# Patient Record
Sex: MALE | Race: BLACK OR AFRICAN AMERICAN | Employment: OTHER | URBAN - METROPOLITAN AREA
[De-identification: names, ages, dates, MRNs, and addresses within clinical notes are randomized per-mention and may not be internally consistent; named-entity substitution may affect disease eponyms.]

---

## 2019-09-18 ENCOUNTER — OFFICE VISIT (OUTPATIENT)
Dept: INTERNAL MEDICINE CLINIC | Age: 64
End: 2019-09-18

## 2019-09-18 VITALS
WEIGHT: 158.4 LBS | OXYGEN SATURATION: 93 % | DIASTOLIC BLOOD PRESSURE: 76 MMHG | SYSTOLIC BLOOD PRESSURE: 119 MMHG | BODY MASS INDEX: 29.91 KG/M2 | RESPIRATION RATE: 18 BRPM | HEIGHT: 61 IN | HEART RATE: 79 BPM | TEMPERATURE: 98.7 F

## 2019-09-18 DIAGNOSIS — B18.2 HEP C W/ COMA, CHRONIC: ICD-10-CM

## 2019-09-18 DIAGNOSIS — R79.9 ABNORMAL FINDING OF BLOOD CHEMISTRY: ICD-10-CM

## 2019-09-18 DIAGNOSIS — F11.20 METHADONE MAINTENANCE THERAPY PATIENT (HCC): ICD-10-CM

## 2019-09-18 DIAGNOSIS — R35.1 NOCTURIA: ICD-10-CM

## 2019-09-18 DIAGNOSIS — F32.A DEPRESSION, UNSPECIFIED DEPRESSION TYPE: ICD-10-CM

## 2019-09-18 DIAGNOSIS — K86.89 MASS OF PANCREAS: Primary | ICD-10-CM

## 2019-09-18 DIAGNOSIS — C7B.8 OTHER SECONDARY NEUROENDOCRINE TUMORS (HCC): ICD-10-CM

## 2019-09-18 NOTE — PATIENT INSTRUCTIONS
Body Mass Index: Care Instructions Your Care Instructions Body mass index (BMI) can help you see if your weight is raising your risk for health problems. It uses a formula to compare how much you weigh with how tall you are. · A BMI lower than 18.5 is considered underweight. · A BMI between 18.5 and 24.9 is considered healthy. · A BMI between 25 and 29.9 is considered overweight. A BMI of 30 or higher is considered obese. If your BMI is in the normal range, it means that you have a lower risk for weight-related health problems. If your BMI is in the overweight or obese range, you may be at increased risk for weight-related health problems, such as high blood pressure, heart disease, stroke, arthritis or joint pain, and diabetes. If your BMI is in the underweight range, you may be at increased risk for health problems such as fatigue, lower protection (immunity) against illness, muscle loss, bone loss, hair loss, and hormone problems. BMI is just one measure of your risk for weight-related health problems. You may be at higher risk for health problems if you are not active, you eat an unhealthy diet, or you drink too much alcohol or use tobacco products. Follow-up care is a key part of your treatment and safety. Be sure to make and go to all appointments, and call your doctor if you are having problems. It's also a good idea to know your test results and keep a list of the medicines you take. How can you care for yourself at home? · Practice healthy eating habits. This includes eating plenty of fruits, vegetables, whole grains, lean protein, and low-fat dairy. · If your doctor recommends it, get more exercise. Walking is a good choice. Bit by bit, increase the amount you walk every day. Try for at least 30 minutes on most days of the week. · Do not smoke. Smoking can increase your risk for health problems.  If you need help quitting, talk to your doctor about stop-smoking programs and medicines. These can increase your chances of quitting for good. · Limit alcohol to 2 drinks a day for men and 1 drink a day for women. Too much alcohol can cause health problems. If you have a BMI higher than 25 · Your doctor may do other tests to check your risk for weight-related health problems. This may include measuring the distance around your waist. A waist measurement of more than 40 inches in men or 35 inches in women can increase the risk of weight-related health problems. · Talk with your doctor about steps you can take to stay healthy or improve your health. You may need to make lifestyle changes to lose weight and stay healthy, such as changing your diet and getting regular exercise. If you have a BMI lower than 18.5 · Your doctor may do other tests to check your risk for health problems. · Talk with your doctor about steps you can take to stay healthy or improve your health. You may need to make lifestyle changes to gain or maintain weight and stay healthy, such as getting more healthy foods in your diet and doing exercises to build muscle. Where can you learn more? Go to http://nadeem-denis.info/. Enter S176 in the search box to learn more about \"Body Mass Index: Care Instructions. \" Current as of: October 13, 2016 Content Version: 11.4 © 0950-5929 Healthwise, Incorporated. Care instructions adapted under license by [employer] (which disclaims liability or warranty for this information). If you have questions about a medical condition or this instruction, always ask your healthcare professional. [place] [address] any warranty or liability for your use of this information.  
n4

## 2019-09-18 NOTE — PROGRESS NOTES
1. Have you been to the ER, urgent care clinic since your last visit? Hospitalized since your last visit? No    2. Have you seen or consulted any other health care providers outside of the [address] [place] [address] since your last visit? Include any pap smears or colon screening.  No     Wants to discuss hep c

## 2019-09-18 NOTE — PROGRESS NOTES
SPORTS MEDICINE AND PRIMARY CARE  Pita Ring MD, [address] [address]  Phone:  [phone]  Fax: [phone]    Chief Complaint   Patient presents with    Establish Care       SUBJECTIVE:    Hernan Johnson is a 75 y.o. male Patient returns today and is seen as a new patient for evaluation and ongoing care. Patient comes in today for evaluation of some abnormal imaging studies, as well as cytology opinion. Review of records from [facility] find that he had an MRCP of the abdomen on 03/15/19 that revealed biliary and pancreatic duct dilatation, the etiology was indeterminant, findings could be related to ampullary stricture and recommended ERCP. On 04/18/19 Bautista Perez performed upper EUS with the impression of normal esophagus, normal stomach, normal exam of duodenum. There was dilatation of the common bile duct, which measures up to 10 mm. Pancreatic duct had dilated _______________ appearance and the pancreatic head. The pancreatic duct measured up to 4 mm in diameter. There was no sign of significant pathology in the pancreatic head, pancreatic body, pancreatic tail and _______________ process of the pancreas. A mass was identified in the pancreatic neck. FNA was performed. The cytology and cyto diagnosis as positive for abnormal cells, well differentiated neuroendocrine tumor not ruled out. The additional cell block material was virtually acellular, therefore additional testing could not be performed. He also brings us lab studies from [facility], indicating he has a hemoglobin A1c of 5.8, done on 12/18/18, cholesterol was high at 218, , calcium 10.5 and hepatitis C virus antibody was greater than 11.0, which was high. Follow up was recommended. CBC was unremarkable from all the information we have. So he wants an opinion from all those things as he is not satisfied with the answers _______________ that he has been given.           Current Outpatient Medications   Medication Sig Dispense Refill    methadone (DOLOPHINE) 10 mg/mL solution Take 58 mg by mouth daily.  aspirin 81 mg chewable tablet Take 81 mg by mouth daily.  multivitamin (ONE A DAY) tablet Take 1 Tab by mouth daily.  LORazepam (ATIVAN) 0.5 mg tablet Take  by mouth nightly as needed for Anxiety. Past Medical History:   Diagnosis Date    Depression     depression    Hep C w/ coma, chronic ([facility] [address] 12/18/19    Hep C    Mass of pancreas 04/18/2019    eus 4-18-19  w. savanah arrieta md gi - cytology + lymphoid vs neuroendocrine tumor    Methadone maintenance therapy patient [facility])      History reviewed. No pertinent surgical history.   No Known Allergies    REVIEW OF SYSTEMS:  General: negative for - chills or fever  ENT: negative for - headaches, nasal congestion or tinnitus  Respiratory: negative for - cough, hemoptysis, shortness of breath or wheezing  Cardiovascular : negative for - chest pain, edema, palpitations or shortness of breath  Gastrointestinal: negative for - abdominal pain, blood in stools, heartburn or nausea/vomiting  Genito-Urinary: no dysuria, trouble voiding, or hematuria  Musculoskeletal: negative for - gait disturbance, joint pain, joint stiffness or joint swelling  Neurological: no TIA or stroke symptoms  Hematologic: no bruises, no bleeding, no swollen glands  Integument: no lumps, mole changes, nail changes or rash  Endocrine:no malaise/lethargy or unexpected weight changes      Social History     Socioeconomic History    Marital status:      Spouse name: Not on file    Number of children: Not on file    Years of education: Not on file    Highest education level: Not on file   Tobacco Use    Smoking status: Current Every Day Smoker     Packs/day: 0.50    Smokeless tobacco: Never Used   Substance and Sexual Activity    Alcohol use: Yes     Frequency: Never     Comment: occasional    Drug use: Not Currently     Comment: quit 40 years ago    Sexual activity: Not Currently     Family History   Problem Relation Age of Onset    Cancer Father      Habits:  Occasional alcohol use. 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 He continues to abuse cigarettes, claims to be trying to stop. His wife stopped two years ago. Social History:   The patient is  for the past 25 years. He has two daughters outside his marriage, 36 and 44, and three grandchildren. He completed the 10th grade, but got his GED. He is a retired . Congregation preference is [employer]. Family History:  Father  48 of cancer, presumably of lung. Mother  80, brain tumor. No siblings. OBJECTIVE:     Visit Vitals  /76   Pulse 79   Temp 98.7 °F (37.1 °C) (Oral)   Resp 18   Ht 5' 1\" (1.549 m)   Wt 158 lb 6.4 oz (71.8 kg)   SpO2 93%   BMI 29.93 kg/m²     CONSTITUTIONAL: well , well nourished, appears age appropriate  EYES: perrla, eom intact  ENMT:moist mucous membranes, pharynx clear  NECK: supple. Thyroid normal  RESPIRATORY: Chest: clear bilaterally  CARDIOVASCULAR: Heart: regular rate and rhythm  GASTROINTESTINAL: Abdomen: soft, bowel sounds active  HEMATOLOGIC: no pathological lymph nodes palpated  MUSCULOSKELETAL: Extremities: no edema, pulse 1+   INTEGUMENT: No unusual rashes or suspicious skin lesions noted. Nails appear normal.  NEUROLOGIC: non-focal exam   MENTAL STATUS: alert and oriented, appropriate affect     No visits with results within 3 Month(s) from this visit. Latest known visit with results is:   No results found for any previous visit. ASSESSMENT:   1. Mass of pancreas    2. Hep C w/ coma, chronic (HCC)    3. Depression, unspecified depression type    4. Methadone maintenance therapy patient ([facility] [address]    5. Nocturia     6. Abnormal finding of blood chemistry     7.  Other secondary neuroendocrine tumors [facility])       *Patient is in a methadone maintenance program and we congratulate him. He is trying to get off methadone also and they are gradually decreasing the dose. The bottom line to the discussion noted above is the mass in his pancreas and we do not have a definite diagnosis, cytology was positive for abnormal cells, whether it was well differentiated neuroendocrine tumor not ruled out versus lymphoid. He wanted a different opinion. Our opinion is that he should see a gastroenterologist as we cannot render an opinion as to the best pathway to go regarding the findings. I suspect he will need a repeat EUS. At the same time, he will have a colonoscopy, which he also needs to have accomplished. He is serologic positive for hepatitis C. Will ask for a genotype and refer him to Dr. Adelaida Martinez. There is a history of depressive illness and he was hospitalized by [facility] in 2018. No evidence of recurrent disease. Patient is advised that we agree he needs to stop smoking cigarettes and to wean himself, it would be best just to stop cold turkey and use a patch and/or gum. He will be back to see us after he sees the physicians we referred him to. Discussed the patient's BMI with him. The BMI follow up plan is as follows:     dietary management education, guidance, and counseling  encourage exercise  monitor weight  prescribed dietary intake    An After Visit Summary was printed and given to the patient. I have discussed the diagnosis with the patient and the intended plan as seen in the  orders above. The patient understands and agees with the plan. The patient has   received an after visit summary and questions were answered concerning  future plans  Patient labs and/or xrays were reviewed  Past records were reviewed.     PLAN:  .  Orders Placed This Encounter    URINALYSIS W/ RFLX MICROSCOPIC    CBC WITH AUTOMATED DIFF    METABOLIC PANEL, COMPREHENSIVE    LIPID PANEL    PROSTATE SPECIFIC AG    HEMOGLOBIN A1C WITH EAG    HEP C GENOTYPE    CEA    REFERRAL TO OPHTHALMOLOGY    REFERRAL FOR COLONOSCOPY    REFERRAL TO GASTROENTEROLOGY    [facility] ref [facility]       Follow-up and Dispositions    · Return in about 3 months (around 12/18/2019). ATTENTION:   This medical record was transcribed using an electronic medical records system. Although proofread, it may and can contain electronic and spelling errors. Other human spelling and other errors may be present. Corrections may be executed at a later time. Please feel free to contact us for any clarifications as needed.

## 2019-09-19 LAB
ALBUMIN SERPL-MCNC: 4.7 G/DL (ref 3.6–4.8)
ALBUMIN/GLOB SERPL: 1.6 {RATIO} (ref 1.2–2.2)
ALP SERPL-CCNC: 65 IU/L (ref 39–117)
ALT SERPL-CCNC: 31 IU/L (ref 0–44)
APPEARANCE UR: CLEAR
AST SERPL-CCNC: 32 IU/L (ref 0–40)
BASOPHILS # BLD AUTO: 0 X10E3/UL (ref 0–0.2)
BASOPHILS NFR BLD AUTO: 1 %
BILIRUB SERPL-MCNC: 0.4 MG/DL (ref 0–1.2)
BILIRUB UR QL STRIP: NEGATIVE
BUN SERPL-MCNC: 11 MG/DL (ref 8–27)
BUN/CREAT SERPL: 12 (ref 10–24)
CALCIUM SERPL-MCNC: 10.2 MG/DL (ref 8.6–10.2)
CEA SERPL-MCNC: 3.8 NG/ML (ref 0–4.7)
CHLORIDE SERPL-SCNC: 99 MMOL/L (ref 96–106)
CHOLEST SERPL-MCNC: 241 MG/DL (ref 100–199)
CO2 SERPL-SCNC: 27 MMOL/L (ref 20–29)
COLOR UR: YELLOW
CREAT SERPL-MCNC: 0.91 MG/DL (ref 0.76–1.27)
EOSINOPHIL # BLD AUTO: 0.4 X10E3/UL (ref 0–0.4)
EOSINOPHIL NFR BLD AUTO: 6 %
ERYTHROCYTE [DISTWIDTH] IN BLOOD BY AUTOMATED COUNT: 14.1 % (ref 12.3–15.4)
EST. AVERAGE GLUCOSE BLD GHB EST-MCNC: 123 MG/DL
GLOBULIN SER CALC-MCNC: 3 G/DL (ref 1.5–4.5)
GLUCOSE SERPL-MCNC: 94 MG/DL (ref 65–99)
GLUCOSE UR QL: NEGATIVE
HBA1C MFR BLD: 5.9 % (ref 4.8–5.6)
HCT VFR BLD AUTO: 43.6 % (ref 37.5–51)
HDLC SERPL-MCNC: 58 MG/DL
HGB BLD-MCNC: 14.3 G/DL (ref 13–17.7)
HGB UR QL STRIP: NEGATIVE
IMM GRANULOCYTES # BLD AUTO: 0 X10E3/UL (ref 0–0.1)
IMM GRANULOCYTES NFR BLD AUTO: 0 %
KETONES UR QL STRIP: NEGATIVE
LDLC SERPL CALC-MCNC: 166 MG/DL (ref 0–99)
LEUKOCYTE ESTERASE UR QL STRIP: NEGATIVE
LYMPHOCYTES # BLD AUTO: 2.6 X10E3/UL (ref 0.7–3.1)
LYMPHOCYTES NFR BLD AUTO: 44 %
MCH RBC QN AUTO: 32.1 PG (ref 26.6–33)
MCHC RBC AUTO-ENTMCNC: 32.8 G/DL (ref 31.5–35.7)
MCV RBC AUTO: 98 FL (ref 79–97)
MICRO URNS: NORMAL
MONOCYTES # BLD AUTO: 0.4 X10E3/UL (ref 0.1–0.9)
MONOCYTES NFR BLD AUTO: 7 %
NEUTROPHILS # BLD AUTO: 2.5 X10E3/UL (ref 1.4–7)
NEUTROPHILS NFR BLD AUTO: 42 %
NITRITE UR QL STRIP: NEGATIVE
PH UR STRIP: 5.5 [PH] (ref 5–7.5)
PLATELET # BLD AUTO: 288 X10E3/UL (ref 150–450)
POTASSIUM SERPL-SCNC: 5.3 MMOL/L (ref 3.5–5.2)
PROT SERPL-MCNC: 7.7 G/DL (ref 6–8.5)
PROT UR QL STRIP: NEGATIVE
PSA SERPL-MCNC: 0.2 NG/ML (ref 0–4)
RBC # BLD AUTO: 4.45 X10E6/UL (ref 4.14–5.8)
SODIUM SERPL-SCNC: 142 MMOL/L (ref 134–144)
SP GR UR: 1.01 (ref 1–1.03)
TRIGL SERPL-MCNC: 85 MG/DL (ref 0–149)
UROBILINOGEN UR STRIP-MCNC: 0.2 MG/DL (ref 0.2–1)
VLDLC SERPL CALC-MCNC: 17 MG/DL (ref 5–40)
WBC # BLD AUTO: 5.9 X10E3/UL (ref 3.4–10.8)

## 2019-09-25 LAB
HCV GENTYP SERPL NAA+PROBE: NORMAL
PLEASE NOTE, 550474: NORMAL

## 2019-10-21 ENCOUNTER — OFFICE VISIT (OUTPATIENT)
Dept: HEMATOLOGY | Age: 64
End: 2019-10-21

## 2019-10-21 VITALS
WEIGHT: 157 LBS | HEART RATE: 85 BPM | OXYGEN SATURATION: 98 % | DIASTOLIC BLOOD PRESSURE: 86 MMHG | TEMPERATURE: 97 F | SYSTOLIC BLOOD PRESSURE: 129 MMHG | BODY MASS INDEX: 29.66 KG/M2

## 2019-10-21 DIAGNOSIS — Z11.59 ENCOUNTER FOR SCREENING FOR OTHER VIRAL DISEASES: ICD-10-CM

## 2019-10-21 DIAGNOSIS — B18.2 HEP C W/ COMA, CHRONIC: Primary | ICD-10-CM

## 2019-10-21 NOTE — PROGRESS NOTES
[address] [facility], [place] Noel Kraft, MD Deirdre Boot, PA-C    Chelsey Barillas, [facility]     Svetlana Moore, [facility]   Syeda Sanches, [facility]    Alexandrea Lopez, [facility]       Sweta Pate [address]    at [address], [address] [address]  [address].    [phone]    FAX: [address] [place] [address] Drive, [address], [address]    [phone]    FAX: [phone]     Patient Care Team:  Carol Kaiser MD (Internal Medicine)    Problem List  Never Reviewed          Codes Class Noted    [facility] ICD-10-CM: B18.2  ICD-9-CM: 070.44  Unknown    Overview Signed 9/18/2019  4:22 PM by Carol Kaiser MD     Hep C             Depression ICD-10-CM: F32.9  ICD-9-CM: 992  Unknown    Overview Signed 9/18/2019  4:35 PM by Carol Kaiser MD     depression             Methadone maintenance therapy patient [facility]) ICD-10-CM: F11.20  ICD-9-CM: 304.00  Unknown        Mass of pancreas ICD-10-CM: K86.89  ICD-9-CM: 577.8  4/18/2019    Overview Signed 9/18/2019  4:22 PM by Carol Kaiser MD     [facility] [id number]  w. md jenaro Parada                 The clinician listed above has asked me to see Edilson Huertas in consultation regarding chronic HCV and its management. All medical records sent by the referring physicians were reviewed. The patient is a 75 y.o.  male who was screened for and subsequently tested positive for chronic HCV in 2019. Risk factors for acquiring HCV are IV drug use. There was no history of acute icteric hepatitis at the time of these risk factors. Imaging of the liver was either not performed or the results are not available to me.       An assessment of liver fibrosis with biopsy or elastography has not been performed. The patient has never received treatment for chronic HCV. The patient has no symptoms which can be attributed to the liver disorder. The patient has not experienced the following symptoms: pain in the right side over the liver, yellowing of the eyes or skin or swelling of the abdomen. The patient completes all daily activities without any functional limitations. ASSESSMENT AND PLAN:  Chronic HCV   Chronic HCV of unclear severity. Liver transaminases are normal. ALP is normal. Liver function is normal. Total bilirubin is normal. Serum albumin is normal. The platelet count is   normal.      Based upon laboratory studies. the patient does not appear to have   appears to have advanced liver disease. Will perform and/or review results of HCV viral load and/or HCV genotype   to define the specific treatment and duration of treatment that will be required. Will perform serologic and virologic studies to assess for other causes of chronic liver disease. Will perform imaging of the liver with ultrasound. The degree of fibrosis will be assessed by liver biopsy, FibroScan or sheer wave elastography. Chronic HCV treatment  The patient has not been treated for HCV. The patient has HCV genotype 1A. Discussed the treatment alternatives. The SVR/cure rate for HCV now exceeds 97% without significant side effects for most patients with HCV. The specific treatment is dependent upon genotype, viral load and histology. Screening for hepatocellular carcinoma  HCC screening is not necessary if the patient has no evidence of cirrhosis. Treatment of other medical problems in patients with chronic liver disease  There are no contraindications for the patient to take most medications necessary for treatment of other medical issues. The patient does not consume alcohol on a daily basis.  Normal doses of acetaminophen, as recommended on the label of the bottle, are not hepatotoxic except in the setting of daily alcohol use. Even patients with cirrhosis can utilize these for pain. Counseling for alcohol in patients with chronic liver disease  The patient was counseled regarding alcohol consumption and the effect of alcohol on chronic liver disease. The patient does not consume any significant amount of alcohol. Drug use  The patient was counseled regarding the risk of overdose and death from using narcotic drugs and the risk of becoming reinfected with HCV once they are cured if they resume narcotic drug use. The patient has not used drugs since 1980. Vaccinations   The need for vaccination against viral hepatitis A and B will be assessed with serologic and instituted as appropriate. Routine vaccinations against other bacterial and viral agents can be performed as indicated. Annual flu vaccination should be administered if indicated. ALLERGIES  No Known Allergies    MEDICATIONS  Current Outpatient Medications   Medication Sig    methadone (DOLOPHINE) 10 mg/mL solution Take 56 mg by mouth daily.  aspirin 81 mg chewable tablet Take 81 mg by mouth daily.  multivitamin (ONE A DAY) tablet Take 1 Tab by mouth daily.  LORazepam (ATIVAN) 0.5 mg tablet Take  by mouth nightly as needed for Anxiety. No current facility-administered medications for this visit. SYSTEM REVIEW NOT RELATED TO LIVER DISEASE OR REVIEWED ABOVE:  Constitution systems: Negative for fever, chills, weight gain, weight loss. Eyes: Negative for visual changes. ENT: Negative for sore throat, painful swallowing. Respiratory: Negative for cough, hemoptysis, SOB. Cardiology: Negative for chest pain, palpitations. GI:  Negative for constipation or diarrhea. : Negative for urinary frequency, dysuria, hematuria, nocturia. Skin: Negative for rash. Hematology: Negative for easy bruising, blood clots. Musculo-skeletal: Negative for back pain, muscle pain, weakness. Neurologic: Negative for headaches, dizziness, vertigo, memory problems not related to HE. Psychology: Negative for anxiety, depression. FAMILY HISTORY:  The father  from lung cancer. The mother  at age 80. Brain tumors. There is no history of liver diseases  There is no history of autoimmune diseases. SOCIAL HISTORY:  The patient is . The spouse has not been tested for HCV and is negative. The patient has 2 children and 4 grandchildren. The patient 1 PPD. The patient occasionally drinks alcohol. The patient is not currently working. PHYSICAL EXAMINATION:  Visit Vitals  /86 (BP 1 Location: Left arm, BP Patient Position: Sitting)   Pulse 85   Temp 97 °F (36.1 °C) (Tympanic)   Wt 157 lb (71.2 kg)   SpO2 98%   BMI 29.66 kg/m²     General: No acute distress. Eyes: Sclera anicteric. ENT: No oral lesions. Nodes: No adenopathy. Skin: No spider angiomata. No jaundice. No palmar erythema. Respiratory: Lungs clear to auscultation. Cardiovascular: Regular heart rate. No murmurs. No JVD. Abdomen: Soft non-tender. Liver size normal to percussion/palpation. Spleen not palpable. No obvious ascites. Extremities: No edema. No muscle wasting. No gross arthritic changes. Neurologic: Alert and oriented. Cranial nerves grossly intact. No asterixis. LABORATORY STUDIES:  Liver [place] of [address] Units 2019   WBC 3.4 - 10.8 x10E3/uL 5.9   ANC 1.4 - 7.0 x10E3/uL 2.5   HGB 13.0 - 17.7 g/dL 14.3    - 450 x10E3/uL 288   AST 0 - 40 IU/L 32   ALT 0 - 44 IU/L 31   Alk Phos 39 - 117 IU/L 65   Bili, Total 0.0 - 1.2 mg/dL 0.4   Albumin 3.6 - 4.8 g/dL 4.7   BUN 8 - 27 mg/dL 11   Creat 0.76 - 1.27 mg/dL 0.91   Na 134 - 144 mmol/L 142   K 3.5 - 5.2 mmol/L 5.3 (H)   Cl 96 - 106 mmol/L 99   CO2 20 - 29 mmol/L 27   Glucose 65 - 99 mg/dL 94     SEROLOGIES:  2019.  GT 1a    LIVER HISTOLOGY:  Not available or performed    ENDOSCOPIC PROCEDURES:  Not available or performed    RADIOLOGY:  3/2019. MRCP. Biliary and pancreatic ductal dilatation. OTHER TESTING:  Not available or performed    FOLLOW-UP:  All of the issues listed above in the assessment and plan were discussed with the patient. All questions were answered. The patient expressed a clear understanding of the above. [address] [place] [address] in 4 weeks for FibroScan, to review all data and determine the treatment plan.     Jennifer Swift, [facility]  Liver [place] [address], [address] [place] [place]  22.  [phone]

## 2019-10-21 NOTE — PROGRESS NOTES
Chief Complaint   Patient presents with   [address] Patient     Hep C        3 most recent PHQ Screens 10/21/2019   Little interest or pleasure in doing things Several days   Feeling down, depressed, irritable, or hopeless Several days   Total Score PHQ 2 2       Abuse Screening Questionnaire 10/21/2019   Do you ever feel afraid of your partner? N   Are you in a relationship with someone who physically or mentally threatens you? N   Is it safe for you to go home?  Y     Learning Assessment 10/21/2019   PRIMARY LEARNER Patient   HIGHEST LEVEL OF EDUCATION - PRIMARY LEARNER  -   BARRIERS PRIMARY LEARNER NONE   CO-LEARNER CAREGIVER No   PRIMARY LANGUAGE ENGLISH   LEARNER PREFERENCE PRIMARY DEMONSTRATION   ANSWERED BY patient   RELATIONSHIP SELF     Visit Vitals  /86 (BP 1 Location: Left arm, BP Patient Position: Sitting)   Pulse 85   Temp 97 °F (36.1 °C) (Tympanic)   Wt 157 lb (71.2 kg)   SpO2 98%   BMI 29.66 kg/m²

## 2019-10-21 NOTE — LETTER
11/14/19 Patient: Percy Arenas YOB: 1955 Date of Visit: 10/21/2019 Antony Lundborg, MD 
[facility] [address] [facility] [address] [address] VIA In Basket Dear Antony Lundborg, MD, Thank you for referring Mr. Per[facility]ren[facility]. My notes for this consultation are attached. If you have questions, please do not hesitate to call me. I look forward to following your patient along with you. Sincerely, Kristal Meadows NP

## 2019-10-22 LAB
HAV AB SER QL IA: NEGATIVE
HBV CORE AB SERPL QL IA: NEGATIVE
HBV SURFACE AB SER QL: NON REACTIVE
HBV SURFACE AG SERPL QL IA: NEGATIVE

## 2019-10-24 LAB
HCV RNA SERPL NAA+PROBE-ACNC: NORMAL IU/ML
HCV RNA SERPL NAA+PROBE-ACNC: NORMAL IU/ML
HCV RNA SERPL NAA+PROBE-LOG IU: 7.25 {LOG_IU}/ML
HCV RNA SERPL QL NAA+PROBE: POSITIVE
TEST INFORMATION: NORMAL

## 2019-11-14 ENCOUNTER — OFFICE VISIT (OUTPATIENT)
Dept: HEMATOLOGY | Age: 64
End: 2019-11-14

## 2019-11-14 VITALS
SYSTOLIC BLOOD PRESSURE: 125 MMHG | OXYGEN SATURATION: 95 % | WEIGHT: 156 LBS | TEMPERATURE: 96.7 F | BODY MASS INDEX: 29.45 KG/M2 | HEIGHT: 61 IN | HEART RATE: 74 BPM | DIASTOLIC BLOOD PRESSURE: 79 MMHG

## 2019-11-14 DIAGNOSIS — B18.2 HEP C W/ COMA, CHRONIC: Primary | ICD-10-CM

## 2019-11-14 NOTE — PROGRESS NOTES
[address] [facility], Maggie LEZAMA Alane Dunn, MD Clent Ask, PA-C Morrell Gallop, ACNP-BC     April S Teresa, [facility] Campus[facility]-BC   CARLY [facility]       [facility]    at [place] [address], [address] [address]  22.    [phone]    FAX: [address] [place] [address], [address], [address]    [phone]    FAX: [phone]     Patient Care Team:  Seema Meehan MD (Internal Medicine)    Problem List  Never Reviewed          Codes Class Noted    Hep [facility]/ [facility] ([facility] ICD-10-CM: B18.2  ICD-9-CM: 070.44  Unknown    Overview Signed 9/18/2019  4:22 PM by Seema Meehan MD     Hep C             Depression ICD-10-CM: F32.9  ICD-9-CM: 780  Unknown    Overview Signed 9/18/2019  4:35 PM by Seema Meehan MD     depression             Methadone maintenance therapy patient [facility]) ICD-10-CM: F11.20  ICD-9-CM: 304.00  Unknown        Mass of pancreas ICD-10-CM: K86.89  ICD-9-CM: 577.8  4/18/2019    Overview Signed 9/18/2019  4:22 PM by Seema Meehan MD     eus [id number]  w. md jenaro Evangelista returns to the [address] for management of chronic HCV. The active problem list, all pertinent past medical history, medications,   liver histology, radiologic findings and laboratory findings related to the liver disorder were reviewed with the patient. The patient is a 75 y.o.  male who was screened for and subsequently tested positive for chronic HCV in 2019. An MRCP shows a normal appearing liver. An assessment of liver fibrosis with elastography will be performed this office visit.       The patient has never received treatment for chronic HCV. The patient has no symptoms which can be attributed to the liver disorder. The patient has not experienced the following symptoms: pain in the right side over the liver, yellowing of the eyes or skin or swelling of the abdomen. The patient completes all daily activities without any functional limitations. ASSESSMENT AND PLAN:  Chronic HCV   Chronic HCV with no fibrosis. Liver transaminases are normal. ALP is normal. Liver function is normal. Total bilirubin is normal. Serum albumin is normal. The platelet count is normal.      Based upon laboratory studies, imaging and elastography, the patient does not appear to have advanced liver disease. The patient has no fibrosis. Chronic HCV treatment  The patient has not been treated for HCV. The patient has HCV genotype 1A. Discussed the treatment alternatives. The SVR/cure rate for HCV now exceeds 97% without significant side effects for most patients with HCV. I will order 8 weeks of Mavyret. Screening for hepatocellular carcinoma  HCC screening is not necessary if the patient has no evidence of cirrhosis. Treatment of other medical problems in patients with chronic liver disease  There are no contraindications for the patient to take most medications necessary for treatment of other medical issues. The patient does not consume alcohol on a daily basis. Normal doses of acetaminophen, as recommended on the label of the bottle, are not hepatotoxic except in the setting of daily alcohol use. Even patients with cirrhosis can utilize these for pain. Counseling for alcohol in patients with chronic liver disease  The patient was counseled regarding alcohol consumption and the effect of alcohol on chronic liver disease. The patient does not consume any significant amount of alcohol.     Drug use  The patient was counseled regarding the risk of overdose and death from using narcotic drugs and the risk of becoming reinfected with HCV once they are cured if they resume narcotic drug use. The patient has not used drugs since . Vaccinations   Vaccination against viral hepatitis A and B is recommended as he has no documented immunity. Routine vaccinations against other bacterial and viral agents can be performed as indicated. Annual flu vaccination should be administered if indicated. ALLERGIES  No Known Allergies    MEDICATIONS  Current Outpatient Medications   Medication Sig    methadone (DOLOPHINE) 10 mg/mL solution Take 56 mg by mouth daily.  aspirin 81 mg chewable tablet Take 81 mg by mouth daily.  multivitamin (ONE A DAY) tablet Take 1 Tab by mouth daily.  LORazepam (ATIVAN) 0.5 mg tablet Take  by mouth nightly as needed for Anxiety. No current facility-administered medications for this visit. SYSTEM REVIEW NOT RELATED TO LIVER DISEASE OR REVIEWED ABOVE:  Constitution systems: Negative for fever, chills, weight gain, weight loss. Eyes: Negative for visual changes. ENT: Negative for sore throat, painful swallowing. Respiratory: Negative for cough, hemoptysis, SOB. Cardiology: Negative for chest pain, palpitations. GI:  Negative for constipation or diarrhea. : Negative for urinary frequency, dysuria, hematuria, nocturia. Skin: Negative for rash. Hematology: Negative for easy bruising, blood clots. Musculo-skeletal: Negative for back pain, muscle pain, weakness. Neurologic: Negative for headaches, dizziness, vertigo, memory problems not related to HE. Psychology: Negative for anxiety, depression. FAMILY HISTORY:  The father  from lung cancer. The mother  at age 80. Brain tumors. There is no history of liver diseases  There is no history of autoimmune diseases. SOCIAL HISTORY:  The patient is . The spouse has not been tested for HCV and is negative. The patient has 2 children and 4 grandchildren. The patient 1 PPD. The patient occasionally drinks alcohol. The patient is not currently working. PHYSICAL EXAMINATION:  Visit Vitals  /79 (BP 1 Location: Right arm, BP Patient Position: Sitting)   Pulse 74   Temp 96.7 °F (35.9 °C) (Tympanic)   Ht 5' 1\" (1.549 m)   Wt 156 lb (70.8 kg)   SpO2 95%   BMI 29.48 kg/m²     General: No acute distress. Eyes: Sclera anicteric. ENT: No oral lesions. Nodes: No adenopathy. Skin: No spider angiomata. No jaundice. No palmar erythema. Respiratory: Lungs clear to auscultation. Cardiovascular: Regular heart rate. No murmurs. No JVD. Abdomen: Soft non-tender. Liver size normal to percussion/palpation. Spleen not palpable. No obvious ascites. Extremities: No edema. No muscle wasting. No gross arthritic changes. Neurologic: Alert and oriented. Cranial nerves grossly intact. No asterixis. LABORATORY STUDIES:  Liver [place] [address] Units 9/18/2019   WBC 3.4 - 10.8 x10E3/uL 5.9   ANC 1.4 - 7.0 x10E3/uL 2.5   HGB 13.0 - 17.7 g/dL 14.3    - 450 x10E3/uL 288   AST 0 - 40 IU/L 32   ALT 0 - 44 IU/L 31   Alk Phos 39 - 117 IU/L 65   Bili, Total 0.0 - 1.2 mg/dL 0.4   Albumin 3.6 - 4.8 g/dL 4.7   BUN 8 - 27 mg/dL 11   Creat 0.76 - 1.27 mg/dL 0.91   Na 134 - 144 mmol/L 142   K 3.5 - 5.2 mmol/L 5.3 (H)   Cl 96 - 106 mmol/L 99   CO2 20 - 29 mmol/L 27   Glucose 65 - 99 mg/dL 94     SEROLOGIES:  Serologies Latest Ref Rng & Units 10/21/2019 9/18/2019   Hep A Ab, Total Negative Negative    Hep B Surface Ag Negative Negative    Hep B Core Ab, Total Negative Negative    Hep B Surface AB QL  Non Reactive    Hep C Genotype   1a   HCV RT-PCR, Quant IU/mL See Final Results      9/2019. GT 1a    LIVER HISTOLOGY:  11/2019. [facility] EkPa was 5.1. IQR/med 8%. . The results suggested a fibrosis level of F0. The CAP score suggests no evidence of fatty liver.      ENDOSCOPIC PROCEDURES:  Not available or performed    RADIOLOGY:  3/2019. MRCP. Biliary and pancreatic ductal dilatation. OTHER TESTING:  Not available or performed    FOLLOW-UP:  All of the issues listed above in the assessment and plan were discussed with the patient. All questions were answered. The patient expressed a clear understanding of the above. [facility] 87 4 weeks after initiation of medical therapy. The patient was advised to call the office when he receives his medication to provide us with his start date and to schedule his 4 week treatment follow up appointment.      ADA Manley-BC  Liver [place] [address] [address] [address]  [address].  [phone]

## 2019-11-26 ENCOUNTER — TELEPHONE (OUTPATIENT)
Dept: HEMATOLOGY | Age: 64
End: 2019-11-26

## 2019-11-26 NOTE — TELEPHONE ENCOUNTER
Message was given to a woman on phone to have patient call back. No information was given. Reschedule appt on 12/12 as provider not in office.

## 2019-12-18 ENCOUNTER — OFFICE VISIT (OUTPATIENT)
Dept: INTERNAL MEDICINE CLINIC | Age: 64
End: 2019-12-18

## 2019-12-18 VITALS
TEMPERATURE: 98.1 F | SYSTOLIC BLOOD PRESSURE: 130 MMHG | RESPIRATION RATE: 18 BRPM | DIASTOLIC BLOOD PRESSURE: 84 MMHG | BODY MASS INDEX: 25.2 KG/M2 | HEART RATE: 69 BPM | HEIGHT: 66 IN | OXYGEN SATURATION: 92 % | WEIGHT: 156.8 LBS

## 2019-12-18 DIAGNOSIS — Z13.39 SCREENING FOR ALCOHOLISM: ICD-10-CM

## 2019-12-18 DIAGNOSIS — B18.2 HEP C W/ COMA, CHRONIC: ICD-10-CM

## 2019-12-18 DIAGNOSIS — E78.5 DYSLIPIDEMIA: ICD-10-CM

## 2019-12-18 DIAGNOSIS — K86.89 MASS OF PANCREAS: ICD-10-CM

## 2019-12-18 DIAGNOSIS — Z00.00 MEDICARE ANNUAL WELLNESS VISIT, SUBSEQUENT: Primary | ICD-10-CM

## 2019-12-18 DIAGNOSIS — R05.9 COUGH: ICD-10-CM

## 2019-12-18 DIAGNOSIS — F32.A DEPRESSION, UNSPECIFIED DEPRESSION TYPE: ICD-10-CM

## 2019-12-18 DIAGNOSIS — F11.20 METHADONE MAINTENANCE THERAPY PATIENT (HCC): ICD-10-CM

## 2019-12-18 DIAGNOSIS — Z13.31 SCREENING FOR DEPRESSION: ICD-10-CM

## 2019-12-18 RX ORDER — ALBUTEROL SULFATE 90 UG/1
2 AEROSOL, METERED RESPIRATORY (INHALATION)
Qty: 1 INHALER | Refills: 11 | Status: SHIPPED | OUTPATIENT
Start: 2019-12-18 | End: 2020-11-23 | Stop reason: SDUPTHER

## 2019-12-18 RX ORDER — CEFUROXIME AXETIL 500 MG/1
500 TABLET ORAL 2 TIMES DAILY
Qty: 14 TAB | Refills: 0 | Status: SHIPPED | OUTPATIENT
Start: 2019-12-18 | End: 2020-01-13 | Stop reason: ALTCHOICE

## 2019-12-18 NOTE — PATIENT INSTRUCTIONS
Medicare Wellness Visit, Male The best way to live healthy is to have a lifestyle where you eat a well-balanced diet, exercise regularly, limit alcohol use, and quit all forms of tobacco/nicotine, if applicable. Regular preventive services are another way to keep healthy. Preventive services (vaccines, screening tests, monitoring & exams) can help personalize your care plan, which helps you manage your own care. Screening tests can find health problems at the earliest stages, when they are easiest to treat. [facility] follows the current, evidence-based guidelines published by the [facility] Gregory Sherman ([facility]) when recommending preventive services for our patients. Because we follow these guidelines, sometimes recommendations change over time as research supports it. (For example, a prostate screening blood test is no longer routinely recommended for men with no symptoms). Of course, you and your doctor may decide to screen more often for some diseases, based on your risk and co-morbidities (chronic disease you are already diagnosed with). Preventive services for you include: - Medicare offers their members a free annual wellness visit, which is time for you and your primary care provider to discuss and plan for your preventive service needs. Take advantage of this benefit every year! 
-All adults over age 72 should receive the recommended pneumonia vaccines. Current USPSTF guidelines recommend a series of two vaccines for the best pneumonia protection.  
-All adults should have a flu vaccine yearly and tetanus vaccine every 10 years. 
-All adults age 48 and older should receive the shingles vaccines (series of two vaccines).       
-All adults age 38-68 who are overweight should have a diabetes screening test once every three years.  
-Other screening tests & preventive services for persons with diabetes include: an eye exam to screen for diabetic retinopathy, a kidney function test, a foot exam, and stricter control over your cholesterol.  
-Cardiovascular screening for adults with routine risk involves an electrocardiogram (ECG) at intervals determined by the provider.  
-Colorectal cancer screening should be done for adults age 54-65 with no increased risk factors for colorectal cancer. There are a number of acceptable methods of screening for this type of cancer. Each test has its own benefits and drawbacks. Discuss with your provider what is most appropriate for you during your annual wellness visit. The different tests include: colonoscopy (considered the best screening method), a fecal occult blood test, a fecal DNA test, and sigmoidoscopy. 
-All adults born between [facility] should be screened once for Hepatitis C. 
-An Abdominal Aortic Aneurysm (AAA) Screening is recommended for men age 73-68 who has ever smoked in their lifetime. Here is a list of your current Health Maintenance items (your personalized list of preventive services) with a due date: 
Health Maintenance Due Topic Date Due  
 Colonoscopy  06/20/1971  Glaucoma Screening   06/20/2018 John Childs Annual Well Visit  08/26/2019

## 2019-12-18 NOTE — PROGRESS NOTES
1. Have you been to the ER, urgent care clinic since your last visit? Hospitalized since your last visit? No    2. Have you seen or consulted any other health care providers outside of the [address] [place] [address] since your last visit? Include any pap smears or colon screening. No   Wants to discuss constipation,stiffness in hands and cold symptoms    This is the Subsequent Medicare Annual Wellness Exam, performed 12 months or more after the Initial AWV or the last Subsequent AWV    I have reviewed the patient's medical history in detail and updated the computerized patient record. History     Patient Active Problem List   Diagnosis Code    Mass of pancreas K86.89    Hep C w/ coma, chronic (HCC) B18.2    Depression F32.9    Methadone maintenance therapy patient ([facility] F11.20     Past Medical History:   Diagnosis Date    Depression     depression    Hep C w/ coma, chronic ([facility] 12/18/19    Hep C    Mass of pancreas 04/18/2019    eus 4-18-19  w. 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 No pertinent surgical history. Current Outpatient Medications   Medication Sig Dispense Refill    glecaprevir-pibrentasvir (MAVYRET) 100-40 mg tab Take 3 Tabs by mouth daily. Indications: Chronic Infection of Genotype 1 Hepatitis C Virus 84 Tab 1    methadone (DOLOPHINE) 10 mg/mL solution Take 56 mg by mouth daily.  aspirin 81 mg chewable tablet Take 81 mg by mouth daily.  multivitamin (ONE A DAY) tablet Take 1 Tab by mouth daily.  LORazepam (ATIVAN) 0.5 mg tablet Take  by mouth nightly as needed for Anxiety.        No Known Allergies    Family History   Problem Relation Age of Onset    Cancer Father      Social History     Tobacco Use    Smoking status: Current Every Day Smoker     Packs/day: 1.00    Smokeless tobacco: Never Used   Substance Use Topics    Alcohol use: Yes     Frequency: Never     Comment: occasional       Depression Risk Factor Screening:     3 most recent PHQ Screens 10/21/2019   Little interest or pleasure in doing things Several days   Feeling down, depressed, irritable, or hopeless Several days   Total Score PHQ 2 2       Alcohol Risk Factor Screening (MALE > 65): Do you average more 1 drink per night or more than 7 drinks a week: No    In the past three months have you have had more than 4 drinks containing alcohol on one occasion: No      Functional Ability and Level of Safety:   Hearing: Hearing is good. Activities of Daily Living: The home contains: no safety equipment. Patient does total self care    Ambulation: with no difficulty    Fall Risk:  Fall Risk Assessment, last 12 mths 12/18/2019   Able to walk? Yes   Fall in past 12 months?  No       Abuse Screen:  Patient is not abused    Cognitive Screening   Has your family/caregiver stated any concerns about your memory: no  Cognitive Screening: Normal - MMSE (Mini Mental Status Exam)    Patient Care Team   Patient Care Team:  Ivan Blunt MD as PCP - [facility] Provider  Ivan Blunt MD (Internal Medicine)    Assessment/Plan   Education and counseling provided:  Are appropriate based on today's review and evaluation        Health Maintenance Due   Topic Date Due    COLONOSCOPY  06/20/1971    GLAUCOMA SCREENING Q2Y  06/20/2018    MEDICARE YEARLY EXAM  08/26/2019

## 2019-12-18 NOTE — PROGRESS NOTES
SPORTS MEDICINE AND PRIMARY CARE  Marion Joseph MD, [address] [address]  Phone:  [phone]  Fax: [phone]      Chief Complaint   Patient presents with    Hypertension         SUBECTIVE:    Marlys Ware is a 75 y.o. 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 Ruthie Dsouza, dyslipidemia, depression, methadone maintenance, and is seen for evaluation. Patient returns today complaining of cough productive of mucopurulent sputum. He has also had some wheezing associated with the cough for the past two to three days. He has had no chills or fever. He is taking Mavyret for his hepatitis C, wonders if that is causing him to have constipation. He also complains of numbness in his pinkies bilaterally and is seen for evaluation. Current Outpatient Medications   Medication Sig Dispense Refill    albuterol (PROVENTIL HFA, VENTOLIN HFA, PROAIR HFA) 90 mcg/actuation inhaler Take 2 Puffs by inhalation every four (4) hours as needed for Wheezing. 1 Inhaler 11    cefUROXime (CEFTIN) 500 mg tablet Take 1 Tab by mouth two (2) times a day. 14 Tab 0    glecaprevir-pibrentasvir (MAVYRET) 100-40 mg tab Take 3 Tabs by mouth daily. Indications: Chronic Infection of Genotype 1 Hepatitis C Virus 84 Tab 1    methadone (DOLOPHINE) 10 mg/mL solution Take 56 mg by mouth daily.  aspirin 81 mg chewable tablet Take 81 mg by mouth daily.  multivitamin (ONE A DAY) tablet Take 1 Tab by mouth daily.  LORazepam (ATIVAN) 0.5 mg tablet Take  by mouth nightly as needed for Anxiety.        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 No pertinent surgical history. No Known Allergies    REVIEW OF SYSTEMS:   No chest pain, no shortness of breath. Social History     Socioeconomic History    Marital status:      Spouse name: Not on file    Number of children: Not on file    Years of education: Not on file    Highest education level: Not on file   Tobacco Use    Smoking status: Current Every Day Smoker     Packs/day: 1.00    Smokeless tobacco: Never Used   Substance and Sexual Activity    Alcohol use: Yes     Frequency: Never     Comment: occasional    Drug use: Not Currently     Comment: quit 40 years ago    Sexual activity: Not Currently   r  Family History   Problem Relation Age of Onset    Cancer Father        OBJECTIVE:  Visit Vitals  /84   Pulse 69   Temp 98.1 °F (36.7 °C) (Oral)   Resp 18   Ht 5' 6\" (1.676 m)   Wt 156 lb 12.8 oz (71.1 kg)   SpO2 92%   BMI 25.31 kg/m²     ENT: perrla,  eom intact  NECK: supple. Thyroid normal  CHEST: clear to ascultation and percussion   HEART: regular rate and rhythm  ABD: soft, bowel sounds active  EXTREMITIES: no edema, pulse 1+     Office Visit on 10/21/2019   Component Date Value Ref Range Status    HCV RNA by GISSELL, QL 10/21/2019 Positive* Negative Final    Positive: HCV RNA Detected    Hep A Ab, total 10/21/2019 Negative  Negative Final    HEP B SURFACE AB, QUAL 10/21/2019 Non Reactive   Final    Comment:               Non Reactive: Inconsistent with immunity,                              less than 10 mIU/mL                Reactive:     Consistent with immunity,                              greater than 9.9 mIU/mL      Hep B Core Ab, total 10/21/2019 Negative  Negative Final    Hep B surface Ag screen 10/21/2019 Negative  Negative Final    Hepatitis C Quantitation 10/21/2019 See Final Results  IU/mL Final    TEST INFORMATION 10/21/2019 Comment   Final    The quantitative range of this assay is 15 IU/mL to 100 million IU/mL.  HCV RNA (INTERNATIONAL UNITS) 10/21/2019 18,000,000  IU/mL Final    HCV log10 10/21/2019 7.255   Final          ASSESSMENT:  1. Medicare annual wellness visit, subsequent    2. Screening for alcoholism    3. Screening for depression    4. Mass of pancreas    5. Hep C w/ coma, chronic (HCC)    6. Dyslipidemia    7. Depression, unspecified depression type    8. Methadone maintenance therapy patient ([address]    9. Cough      I am concerned about the ______________ tumor. He did not keep the appointment to see Dr. Nikko Khan and therefore will make another appointment for him. He is actively being treated for his hepatitis C and is taking his medications regularly. Cholesterol was up. Will wait till his next visit to begin treatment, particularly in view of the medication he is taking now for his hepatitis C. He remains in methadone maintenance program.  He tells me that they are decreasing the dose. Persistent cough and smoker. Will ask for a chest x-ray, I do not think he has pneumonia. He has broad spectrum antibiotics. I think this represents a bronchitis. Will give him a rescue inhaler to use. If he does not respond he will be back to see us in a week or two, otherwise in three or four months. He stopped cigarettes for the past three days. I have discussed the diagnosis with the patient and the intended plan as seen in the  orders above. The patient understands and agees with the plan. The patient has   received an after visit summary and questions were answered concerning  future plans  Patient labs and/or xrays were reviewed  Past records were reviewed. PLAN:  .  Orders Placed This Encounter    Depression Screen Annual    XR CHEST PA LAT    REFERRAL TO GASTROENTEROLOGY    albuterol (PROVENTIL HFA, VENTOLIN HFA, PROAIR HFA) 90 mcg/actuation inhaler    cefUROXime (CEFTIN) 500 mg tablet       Follow-up and Dispositions    · Return in about 4 months (around 4/18/2020). ATTENTION:   This medical record was transcribed using an electronic medical records system. Although proofread, it may and can contain electronic and spelling errors. Other human spelling and other errors may be present. Corrections may be executed at a later time. Please feel free to contact us for any clarifications as needed.

## 2020-01-13 ENCOUNTER — OFFICE VISIT (OUTPATIENT)
Dept: HEMATOLOGY | Age: 65
End: 2020-01-13

## 2020-01-13 VITALS
HEIGHT: 66 IN | SYSTOLIC BLOOD PRESSURE: 131 MMHG | BODY MASS INDEX: 25.49 KG/M2 | TEMPERATURE: 97.7 F | WEIGHT: 158.6 LBS | HEART RATE: 73 BPM | DIASTOLIC BLOOD PRESSURE: 75 MMHG | OXYGEN SATURATION: 94 %

## 2020-01-13 DIAGNOSIS — B18.2 HEP C W/ COMA, CHRONIC: Primary | ICD-10-CM

## 2020-01-13 NOTE — PROGRESS NOTES
Jordon Winter is a 75 y.o. male  Chief Complaint   Patient presents with    Follow-up     meds follow up         Visit Vitals  /75 (BP 1 Location: Left arm, BP Patient Position: Sitting)   Pulse 73   Temp 97.7 °F (36.5 °C) (Tympanic)   Ht 5' 6\" (1.676 m)   Wt 158 lb 9.6 oz (71.9 kg)   SpO2 94%   BMI 25.60 kg/m²     3 most recent PHQ Screens 1/13/2020   Little interest or pleasure in doing things Not at all   Feeling down, depressed, irritable, or hopeless Not at all   Total Score PHQ 2 0     Learning Assessment 1/13/2020   PRIMARY LEARNER Patient   HIGHEST LEVEL OF EDUCATION - PRIMARY LEARNER  -   BARRIERS PRIMARY LEARNER NONE   CO-LEARNER CAREGIVER No   PRIMARY LANGUAGE ENGLISH   LEARNER PREFERENCE PRIMARY DEMONSTRATION   ANSWERED BY patient   RELATIONSHIP SELF     Abuse Screening Questionnaire 1/13/2020   Do you ever feel afraid of your partner? N   Are you in a relationship with someone who physically or mentally threatens you? N   Is it safe for you to go home? Y         1. Have you been to the ER, urgent care clinic since your last visit? Hospitalized since your last visit? No    2. Have you seen or consulted any other health care providers outside of the [address] [place] [address] since your last visit? Include any pap smears or colon screening.  No

## 2020-01-13 NOTE — PROGRESS NOTES
[address] [facility], Oleg LEZAMA Alberteen Athens, MD Vidal Retort, PA-C    Robert Lew, ACNP-BC     Svetlana Moore, [facility]ale[facility]-BC   Martin Hernandes FNP-C    Yared Graham, [facility]       Sweta Pate John De Chang 136    at [address], [address] [place] [address]    [address] [facility] 22.    [phone]    FAX: [address] [place] [address], [address]    [phone]    FAX: [phone]     Patient Care Team:  Sudhakar Zimmer MD (Internal Medicine)    Problem List  Date Reviewed: 11/14/2019          Codes Class Noted    Dyslipidemia ICD-10-CM: E78.5  ICD-9-CM: 272.4  Unknown        [facility]/ [facility] ICD-10-CM: B18.2  ICD-9-CM: 070.44  Unknown    Overview Signed 9/18/2019  4:22 PM by Sudhakar Zimmer MD     Hep C             Depression ICD-10-CM: F32.9  ICD-9-CM: 104  Unknown    Overview Signed 9/18/2019  4:35 PM by Sudhakar Zimmer MD     depression             Methadone maintenance therapy patient [facility]) ICD-10-CM: F11.20  ICD-9-CM: 304.00  Unknown        Mass of pancreas ICD-10-CM: K86.89  ICD-9-CM: 577.8  4/18/2019    Overview Signed 9/18/2019  4:22 PM by Sudhakar Zimmer MD     eus 7-08-07  w. md jenaro Cabrera returns to the [address] for management of chronic HCV. The active problem list, all pertinent past medical history, medications, liver histology, radiologic findings and laboratory findings related to the liver disorder were reviewed with the patient. The patient is a 75 y.o.  male who was screened for and subsequently tested positive for chronic HCV in 2019. An MRCP shows a normal appearing liver.        An assessment of liver fibrosis with elastography suggested no fibrosis. The patient has completed 8 weeks of [address]. The patient has no symptoms which can be attributed to the liver disorder. The patient has not experienced the following symptoms: pain in the right side over the liver, yellowing of the eyes or skin or swelling of the abdomen. The patient completes all daily activities without any functional limitations. ASSESSMENT AND PLAN:  Chronic HCV   Chronic HCV with no fibrosis. Liver transaminases are normal. ALP is normal. Liver function is normal. Total bilirubin is normal. Serum albumin is normal. The platelet count is normal.      Based upon laboratory studies, imaging and elastography, the patient does not appear to have advanced liver disease. The patient has no fibrosis. Chronic HCV treatment  The patient has HCV genotype 1A and has completed 8 weeks of Mavyret. Screening for hepatocellular carcinoma  HCC screening is not necessary if the patient has no evidence of cirrhosis. Treatment of other medical problems in patients with chronic liver disease  There are no contraindications for the patient to take most medications necessary for treatment of other medical issues. The patient does not consume alcohol on a daily basis. Normal doses of acetaminophen, as recommended on the label of the bottle, are not hepatotoxic except in the setting of daily alcohol use. Even patients with cirrhosis can utilize these for pain. Counseling for alcohol in patients with chronic liver disease  The patient was counseled regarding alcohol consumption and the effect of alcohol on chronic liver disease. The patient does not consume any significant amount of alcohol. Vaccinations   Vaccination against viral hepatitis A and B is recommended as he has no documented immunity. Routine vaccinations against other bacterial and viral agents can be performed as indicated.  Annual flu vaccination should be administered if indicated. ALLERGIES  No Known Allergies    MEDICATIONS  Current Outpatient Medications   Medication Sig    albuterol (PROVENTIL HFA, VENTOLIN HFA, PROAIR HFA) 90 mcg/actuation inhaler Take 2 Puffs by inhalation every four (4) hours as needed for Wheezing.  cefUROXime (CEFTIN) 500 mg tablet Take 1 Tab by mouth two (2) times a day.  glecaprevir-pibrentasvir (MAVYRET) 100-40 mg tab Take 3 Tabs by mouth daily. Indications: Chronic Infection of Genotype 1 Hepatitis C Virus    methadone (DOLOPHINE) 10 mg/mL solution Take 56 mg by mouth daily.  aspirin 81 mg chewable tablet Take 81 mg by mouth daily.  multivitamin (ONE A DAY) tablet Take 1 Tab by mouth daily.  LORazepam (ATIVAN) 0.5 mg tablet Take  by mouth nightly as needed for Anxiety. No current facility-administered medications for this visit. SYSTEM REVIEW NOT RELATED TO LIVER DISEASE OR REVIEWED ABOVE:  Constitution systems: Negative for fever, chills, weight gain, weight loss. Eyes: Negative for visual changes. ENT: Negative for sore throat, painful swallowing. Respiratory: Negative for cough, hemoptysis, SOB. Cardiology: Negative for chest pain, palpitations. GI:  Negative for constipation or diarrhea. : Negative for urinary frequency, dysuria, hematuria, nocturia. Skin: Negative for rash. Hematology: Negative for easy bruising, blood clots. Musculo-skeletal: Negative for back pain, muscle pain, weakness. Neurologic: Negative for headaches, dizziness, vertigo, memory problems not related to HE. Psychology: Negative for anxiety, depression. FAMILY HISTORY:  The father  from lung cancer. The mother  at age 80. Brain tumors. There is no history of liver diseases  There is no history of autoimmune diseases. SOCIAL HISTORY:  The patient is . The spouse has not been tested for HCV and is negative. The patient has 2 children and 4 grandchildren. The patient 1 PPD.    The patient occasionally drinks alcohol. The patient is not currently working. PHYSICAL EXAMINATION:  Visit Vitals  /75 (BP 1 Location: Left arm, BP Patient Position: Sitting)   Pulse 73   Temp 97.7 °F (36.5 °C) (Tympanic)   Ht 5' 6\" (1.676 m)   Wt 158 lb 9.6 oz (71.9 kg)   SpO2 94%   BMI 25.60 kg/m²     General: No acute distress. Eyes: Sclera anicteric. ENT: No oral lesions. Nodes: No adenopathy. Skin: No spider angiomata. No jaundice. No palmar erythema. Respiratory: Lungs clear to auscultation. Cardiovascular: Regular heart rate. No murmurs. No JVD. Abdomen: Soft non-tender. Liver size normal to percussion/palpation. Spleen not palpable. No obvious ascites. Extremities: No edema. No muscle wasting. No gross arthritic changes. Neurologic: Alert and oriented. Cranial nerves grossly intact. No asterixis. LABORATORY STUDIES:  Liver [place] of [address] Units 9/18/2019   WBC 3.4 - 10.8 x10E3/uL 5.9   ANC 1.4 - 7.0 x10E3/uL 2.5   HGB 13.0 - 17.7 g/dL 14.3    - 450 x10E3/uL 288   AST 0 - 40 IU/L 32   ALT 0 - 44 IU/L 31   Alk Phos 39 - 117 IU/L 65   Bili, Total 0.0 - 1.2 mg/dL 0.4   Albumin 3.6 - 4.8 g/dL 4.7   BUN 8 - 27 mg/dL 11   Creat 0.76 - 1.27 mg/dL 0.91   Na 134 - 144 mmol/L 142   K 3.5 - 5.2 mmol/L 5.3 (H)   Cl 96 - 106 mmol/L 99   CO2 20 - 29 mmol/L 27   Glucose 65 - 99 mg/dL 94     SEROLOGIES:  Serologies Latest Ref Rng & Units 10/21/2019 9/18/2019   Hep A Ab, Total Negative Negative    Hep B Surface Ag Negative Negative    Hep B Core Ab, Total Negative Negative    Hep B Surface AB QL  Non Reactive    Hep C Genotype   1a   HCV RT-PCR, Quant IU/mL See Final Results      9/2019. GT 1a    LIVER HISTOLOGY:  11/2019. FibroScan performed at [address]. EkPa was 5.1. IQR/med 8%. . The results suggested a fibrosis level of F0. The CAP score suggests no evidence of fatty liver. ENDOSCOPIC PROCEDURES:  Not available or performed    RADIOLOGY:  3/2019. MRCP. Biliary and pancreatic ductal dilatation. OTHER TESTING:  Not available or performed    FOLLOW-UP:  All of the issues listed above in the assessment and plan were discussed with the patient. All questions were answered. The patient expressed a clear understanding of the above. [facility] 87 in 3 months for SVR.     Melissa Pritchett, [facility]-BC  Liver [facility] [address] [email or web address] Few[facility]ion [facility], [address] [address]  22. [phone]

## 2020-01-14 LAB
ALBUMIN SERPL-MCNC: 4.6 G/DL (ref 3.6–4.8)
ALP SERPL-CCNC: 50 IU/L (ref 39–117)
ALT SERPL-CCNC: 14 IU/L (ref 0–44)
AST SERPL-CCNC: 17 IU/L (ref 0–40)
BILIRUB DIRECT SERPL-MCNC: 0.14 MG/DL (ref 0–0.4)
BILIRUB SERPL-MCNC: 0.5 MG/DL (ref 0–1.2)
BUN SERPL-MCNC: 15 MG/DL (ref 8–27)
BUN/CREAT SERPL: 17 (ref 10–24)
CALCIUM SERPL-MCNC: 10.2 MG/DL (ref 8.6–10.2)
CHLORIDE SERPL-SCNC: 101 MMOL/L (ref 96–106)
CO2 SERPL-SCNC: 23 MMOL/L (ref 20–29)
CREAT SERPL-MCNC: 0.86 MG/DL (ref 0.76–1.27)
ERYTHROCYTE [DISTWIDTH] IN BLOOD BY AUTOMATED COUNT: 12.9 % (ref 11.6–15.4)
GLUCOSE SERPL-MCNC: 94 MG/DL (ref 65–99)
HCT VFR BLD AUTO: 42.5 % (ref 37.5–51)
HGB BLD-MCNC: 14.3 G/DL (ref 13–17.7)
MCH RBC QN AUTO: 31.7 PG (ref 26.6–33)
MCHC RBC AUTO-ENTMCNC: 33.6 G/DL (ref 31.5–35.7)
MCV RBC AUTO: 94 FL (ref 79–97)
PLATELET # BLD AUTO: 205 X10E3/UL (ref 150–450)
POTASSIUM SERPL-SCNC: 4.7 MMOL/L (ref 3.5–5.2)
PROT SERPL-MCNC: 7 G/DL (ref 6–8.5)
RBC # BLD AUTO: 4.51 X10E6/UL (ref 4.14–5.8)
SODIUM SERPL-SCNC: 141 MMOL/L (ref 134–144)
WBC # BLD AUTO: 6.7 X10E3/UL (ref 3.4–10.8)

## 2020-01-16 LAB — HCV RNA SERPL QL NAA+PROBE: NEGATIVE

## 2020-03-24 ENCOUNTER — DOCUMENTATION ONLY (OUTPATIENT)
Dept: HEMATOLOGY | Age: 65
End: 2020-03-24

## 2020-04-13 ENCOUNTER — VIRTUAL VISIT (OUTPATIENT)
Dept: HEMATOLOGY | Age: 65
End: 2020-04-13

## 2020-04-13 DIAGNOSIS — B18.2 HEP C W/ COMA, CHRONIC: Primary | ICD-10-CM

## 2020-04-13 NOTE — PROGRESS NOTES
[address] [place] [address], MD, MD Giorgio Bishop, PAAlyC    Anamaria Walton, [facility]     Svetlana JAVIER Teresa [facility]   Lj Chiang FNP-C    Marcial Salter [facility]       Sweta Pate [facility] 136    at [address]    [address] [facility], [address] [place] [address], [facility] 22.    [phone]    FAX: [address] [place] [address], [address]    [phone]    FAX: [phone]     Patient Care Team:  Niles Feliciano MD as PCP - [facility]  Niles Feliciano MD (Internal Medicine)    Problem List  Date Reviewed: 11/14/2019          Codes Class Noted    Dyslipidemia ICD-10-CM: E78.5  ICD-9-CM: 272.4  Unknown        Hep C w/ coma, chronic ([facility] ICD-10-CM: B18.2  ICD-9-CM: 070.44  Unknown    Overview Signed 9/18/2019  4:22 PM by Niles Feliciano MD     Hep C             Depression ICD-10-CM: F32.9  ICD-9-CM: 912  Unknown    Overview Signed 9/18/2019  4:35 PM by Niles Feliciano MD     depression             Methadone maintenance therapy patient [facility]) ICD-10-CM: F11.20  ICD-9-CM: 304.00  Unknown        Mass of pancreas ICD-10-CM: K86.89  ICD-9-CM: 577.8  4/18/2019    Overview Signed 9/18/2019  4:22 PM by Niles Feliciano MD     [facility] 5-05-10  w. Placido arrieta md gi                 VIRTUAL TELEHEALTH VISIT PERFORMED DUE TO COVID-19 EPIDEMIC    CONSENT:  Joshua Black, who was seen by synchronous, real-time, audio-video technology, and/or his healthcare decision maker, is aware that this patient-initiated, TeleHealth encounter on 4/13/2020 is a billable service, with coverage as determined by his insurance carrier. He is aware he may receive a bill and has provided verbal consent to proceed.     This patient was evaluated during a Virtual TeleHealth visit. A caregiver was present if appropriate. Due to this being a TeleHealth encounter performed during the ITL-10 public health emergency, the physical examination was limited to that listed in the [address] returns to the [place] [address] for management of chronic HCV. The active problem list, all pertinent past medical history, medications, liver histology, radiologic findings and laboratory findings related to the liver disorder were reviewed with the patient. The patient is a 75 y.o.  male who was screened for and subsequently tested positive for chronic HCV in 2019. An MRCP shows a normal appearing liver. An assessment of liver fibrosis with elastography suggested no fibrosis. The patient has completed 8 weeks of [address] (treated 11/2019 -1/2020). This is his SVR visit. The patient has no symptoms which can be attributed to the liver disorder. The patient has not experienced the following symptoms: pain in the right side over the liver, yellowing of the eyes or skin or swelling of the abdomen. The patient completes all daily activities without any functional limitations. ASSESSMENT AND PLAN:  Chronic HCV   Chronic HCV with no fibrosis. Liver transaminases are normal. ALP is normal. Liver function is normal. Total bilirubin is normal. Serum albumin is normal. The platelet count is normal.      Based upon laboratory studies, imaging and elastography, the patient does not appear to have advanced liver disease. The patient has no fibrosis. Chronic HCV treatment  The patient has HCV genotype 1A and has completed 8 weeks of Mavyret (11/2019 -1/2020). I will check him today for sustained virologic response or cure. I will follow him for about a year after end of treatment.      Screening for hepatocellular carcinoma  HCC screening is not necessary if the patient has no evidence of cirrhosis. Treatment of other medical problems in patients with chronic liver disease  There are no contraindications for the patient to take most medications necessary for treatment of other medical issues. The patient does not consume alcohol on a daily basis. Normal doses of acetaminophen, as recommended on the label of the bottle, are not hepatotoxic except in the setting of daily alcohol use. Even patients with cirrhosis can utilize these for pain. Counseling for alcohol in patients with chronic liver disease  The patient was counseled regarding alcohol consumption and the effect of alcohol on chronic liver disease. The patient does not consume any significant amount of alcohol. Vaccinations   Vaccination against viral hepatitis A and B is recommended as he has no documented immunity. Routine vaccinations against other bacterial and viral agents can be performed as indicated. Annual flu vaccination should be administered if indicated. ALLERGIES  No Known Allergies    MEDICATIONS  Current Outpatient Medications   Medication Sig    albuterol (PROVENTIL HFA, VENTOLIN HFA, PROAIR HFA) 90 mcg/actuation inhaler Take 2 Puffs by inhalation every four (4) hours as needed for Wheezing.  methadone (DOLOPHINE) 10 mg/mL solution Take 56 mg by mouth daily.  aspirin 81 mg chewable tablet Take 81 mg by mouth daily.  multivitamin (ONE A DAY) tablet Take 1 Tab by mouth daily.  LORazepam (ATIVAN) 0.5 mg tablet Take  by mouth nightly as needed for Anxiety. No current facility-administered medications for this visit. SYSTEM REVIEW NOT RELATED TO LIVER DISEASE OR REVIEWED ABOVE:  Constitution systems: Negative for fever, chills, weight gain, weight loss. Eyes: Negative for visual changes. ENT: Negative for sore throat, painful swallowing. Respiratory: Negative for cough, hemoptysis, SOB. Cardiology: Negative for chest pain, palpitations. GI:  Negative for constipation or diarrhea.   : Negative for urinary frequency, dysuria, hematuria, nocturia. Skin: Negative for rash. Hematology: Negative for easy bruising, blood clots. Musculo-skeletal: Negative for back pain, muscle pain, weakness. Neurologic: Negative for headaches, dizziness, vertigo, memory problems not related to HE. Psychology: Negative for anxiety, depression. FAMILY HISTORY:  The father  from lung cancer. The mother  at age 80. Brain tumors. There is no history of liver diseases  There is no history of autoimmune diseases. SOCIAL HISTORY:  The patient is . The spouse has not been tested for HCV and is negative. The patient has 2 children and 4 grandchildren. The patient 1 PPD. The patient occasionally drinks alcohol. The patient is not currently working. PHYSICAL EXAMINATION VIA TELEHEALTH:  No vitals were taken. General: No acute distress. Eyes: Sclera anicteric. ENT: No oral lesions. Nodes: No adenopathy. Skin: No spider angiomata. No jaundice. No palmar erythema. Respiratory: No wheezing, respiratory distress, cyanosis. Cardiovascular: No JVD. Abdomen: Appears soft with no obvious ascites. Extremities: No edema. No muscle wasting. No gross arthritic changes. Neurologic: Alert and oriented. Cranial nerves grossly intact. No asterixis.     LABORATORY STUDIES:  Liver [place] of [address] Units 2020   WBC 3.4 - 10.8 x10E3/uL 6.7 5.9   ANC 1.4 - 7.0 x10E3/uL  2.5   HGB 13.0 - 17.7 g/dL 14.3 14.3    - 450 x10E3/uL 205 288   AST 0 - 40 IU/L 17 32   ALT 0 - 44 IU/L 14 31   Alk Phos 39 - 117 IU/L 50 65   Bili, Total 0.0 - 1.2 mg/dL 0.5 0.4   Bili, Direct 0.00 - 0.40 mg/dL 0.14    Albumin 3.6 - 4.8 g/dL 4.6 4.7   BUN 8 - 27 mg/dL 15 11   Creat 0.76 - 1.27 mg/dL 0.86 0.91   Na 134 - 144 mmol/L 141 142   K 3.5 - 5.2 mmol/L 4.7 5.3 (H)   Cl 96 - 106 mmol/L 101 99   CO2 20 - 29 mmol/L 23 27   Glucose 65 - 99 mg/dL 94 94 SEROLOGIES:  Virology Latest Ref Rng & Units 1/13/2020 10/21/2019   HCV RNA (IU) IU/mL  18,000,000   HCV RNA, GISSELL, QL Negative Negative Positive (A)     Serologies Latest Ref Rng & Units 10/21/2019 9/18/2019   Hep A Ab, Total Negative Negative    Hep B Surface Ag Negative Negative    Hep B Core Ab, Total Negative Negative    Hep B Surface AB QL  Non Reactive    Hep C Genotype   1a   HCV RT-PCR, Quant IU/mL See Final Results      9/2019. GT 1a    LIVER HISTOLOGY:  11/2019. FibroScan performed at [address]. EkPa was 5.1. IQR/med 8%. . The results suggested a fibrosis level of F0. The CAP score suggests no evidence of fatty liver. ENDOSCOPIC PROCEDURES:  Not available or performed    RADIOLOGY:  3/2019. MRCP. Biliary and pancreatic ductal dilatation. OTHER TESTING:  Not available or performed    FOLLOW-UP:  All of the issues listed above in the assessment and plan were discussed with the patient. All questions were answered. The patient expressed a clear understanding of the above. Pursuant to the emergency declaration under the [facility], [facility] waiver authority and the Compliance Assurance and [employer] Act, this Virtual Visit was conducted, with the patient's (and/or their legal guardian's) consent, to reduce the patient's risk of exposure to COVID-19 and provide necessary medical care. Services were provided through a video synchronous discussion virtually to substitute for an in-person clinic visit. The patient was located in his home. The provider was located in the [place] [address] office. Because of the COVID-19 epidemic, all non-emergent diagnostic testing will be delayed for 3-4 months to reduce the risk of patient exposure to and potential infection from the novel corona virus. Orders to obtain laboratory testing will be mailed to the patient.      An in-person follow-up visit will be scheduled in 6 months.      Tim Number, ACNP-BC  Liver [place] [address] StreetQubulus [facility], [address] [address]  22.  [phone]

## 2020-04-20 LAB
ERYTHROCYTE [DISTWIDTH] IN BLOOD BY AUTOMATED COUNT: 12.7 % (ref 11.6–15.4)
HCT VFR BLD AUTO: 42.2 % (ref 37.5–51)
HGB BLD-MCNC: 14.3 G/DL (ref 13–17.7)
MCH RBC QN AUTO: 33 PG (ref 26.6–33)
MCHC RBC AUTO-ENTMCNC: 33.9 G/DL (ref 31.5–35.7)
MCV RBC AUTO: 98 FL (ref 79–97)
PLATELET # BLD AUTO: 236 X10E3/UL (ref 150–450)
RBC # BLD AUTO: 4.33 X10E6/UL (ref 4.14–5.8)
WBC # BLD AUTO: 6.4 X10E3/UL (ref 3.4–10.8)

## 2020-04-21 LAB
ALBUMIN SERPL-MCNC: 4.8 G/DL (ref 3.8–4.8)
ALP SERPL-CCNC: 49 IU/L (ref 39–117)
ALT SERPL-CCNC: 18 IU/L (ref 0–44)
AST SERPL-CCNC: 18 IU/L (ref 0–40)
BILIRUB DIRECT SERPL-MCNC: 0.18 MG/DL (ref 0–0.4)
BILIRUB SERPL-MCNC: 0.8 MG/DL (ref 0–1.2)
BUN SERPL-MCNC: 10 MG/DL (ref 8–27)
BUN/CREAT SERPL: 11 (ref 10–24)
CALCIUM SERPL-MCNC: 9.9 MG/DL (ref 8.6–10.2)
CHLORIDE SERPL-SCNC: 99 MMOL/L (ref 96–106)
CO2 SERPL-SCNC: 23 MMOL/L (ref 20–29)
CREAT SERPL-MCNC: 0.91 MG/DL (ref 0.76–1.27)
GLUCOSE SERPL-MCNC: 117 MG/DL (ref 65–99)
POTASSIUM SERPL-SCNC: 3.9 MMOL/L (ref 3.5–5.2)
PROT SERPL-MCNC: 6.9 G/DL (ref 6–8.5)
SODIUM SERPL-SCNC: 139 MMOL/L (ref 134–144)

## 2020-04-22 ENCOUNTER — VIRTUAL VISIT (OUTPATIENT)
Dept: INTERNAL MEDICINE CLINIC | Age: 65
End: 2020-04-22

## 2020-04-22 DIAGNOSIS — E78.5 DYSLIPIDEMIA: Primary | ICD-10-CM

## 2020-04-22 DIAGNOSIS — F32.A DEPRESSION, UNSPECIFIED DEPRESSION TYPE: ICD-10-CM

## 2020-04-22 DIAGNOSIS — K86.89 MASS OF PANCREAS: ICD-10-CM

## 2020-04-22 DIAGNOSIS — B18.2 HEP C W/ COMA, CHRONIC: ICD-10-CM

## 2020-04-22 DIAGNOSIS — F11.20 METHADONE MAINTENANCE THERAPY PATIENT (HCC): ICD-10-CM

## 2020-04-22 LAB — HCV RNA SERPL QL NAA+PROBE: NEGATIVE

## 2020-04-22 RX ORDER — POLYETHYLENE GLYCOL 3350 17 G/17G
17 POWDER, FOR SOLUTION ORAL 2 TIMES DAILY
Qty: 60 PACKET | Refills: 11 | Status: SHIPPED | OUTPATIENT
Start: 2020-04-22

## 2020-04-22 NOTE — ASSESSMENT & PLAN NOTE
This condition is managed by Specialist.  Key Psychotherapeutic Meds     Patient is on no psychotherapeutic meds. Other Key Behavioral Health Meds             methadone (DOLOPHINE) 10 mg/mL solution (Taking) Take 56 mg by mouth daily.         Lab Results   Component Value Date/Time    Sodium 139 04/20/2020 11:01 AM    Creatinine 0.91 04/20/2020 11:01 AM    WBC 6.4 04/20/2020 11:01 AM    ALT (SGPT) 18 04/20/2020 11:01 AM    AST (SGOT) 18 04/20/2020 11:01 AM

## 2020-04-22 NOTE — PROGRESS NOTES
1. Have you been to the ER, urgent care clinic since your last visit? Hospitalized since your last visit? No    2. Have you seen or consulted any other health care providers outside of the [address] [place] [address] since your last visit? Include any pap smears or colon screening.  No   wants to discuss cough

## 2020-04-22 NOTE — PROGRESS NOTES
Consent: [place] Hatchet, who was seen by synchronous (real-time) audio-video technology, and/or his healthcare decision maker, is aware that this patient-initiated, Telehealth encounter on 4/22/2020 is a billable service, with coverage as determined by his insurance carrier. He is aware that he may receive a bill and has provided verbal consent to proceed: Yes. Assessment & Plan:   Diagnoses and all orders for this visit:    1. Dyslipidemia    2. Methadone maintenance therapy patient [facility])  Assessment & Plan: This condition is managed by Specialist.  Key Psychotherapeutic Meds     Patient is on no psychotherapeutic meds. Other Key Behavioral Health Meds             methadone (DOLOPHINE) 10 mg/mL solution (Taking) Take 56 mg by mouth daily. Lab Results   Component Value Date/Time    Sodium 139 04/20/2020 11:01 AM    Creatinine 0.91 04/20/2020 11:01 AM    WBC 6.4 04/20/2020 11:01 AM    ALT (SGPT) 18 04/20/2020 11:01 AM    AST (SGOT) 18 04/20/2020 11:01 AM         3. Mass of pancreas    4. Hep C w/ coma, chronic (HCC)  Assessment & Plan: This condition is managed by Specialist.     Lab Results   Component Value Date/Time    ALT (SGPT) 18 04/20/2020 11:01 AM    AST (SGOT) 18 04/20/2020 11:01 AM    Alk. phosphatase 49 04/20/2020 11:01 AM    Bilirubin, direct 0.18 04/20/2020 11:01 AM    Bilirubin, total 0.8 04/20/2020 11:01 AM    Albumin 4.8 04/20/2020 11:01 AM    Protein, total 6.9 04/20/2020 11:01 AM    HGB 14.3 04/20/2020 11:01 AM    HCT 42.2 04/20/2020 11:01 AM    PLATELET 618 88/19/3230 11:01 AM         5. Depression, unspecified depression type    We congratulate him on not smoking cigarettes encourage him not to go back. I think he is convinced not to go back. For the constipation will place him on MiraLAX. Dyslipidemia with diet and statin. The mass of the pancreas may be a neuroendocrine tumor the results of that evaluation are not known to us.     He is a little depressed and concerned about his wife they have been  for about 25 years. He will see us again in 3 months sooner if he has any problems. Follow-up and Dispositions    · Return in about 3 months (around 7/22/2020). 712  Subjective: Domenic Chung is a 75 y.o. male who was seen for Cough  Patient is seen today as a telehealth visit. He has a known history of methadone maintenance hepatitis C treated by Dr. Shavonne Harris, dyslipidemia, mass of the pancreas evaluated by GI, depression, cigarette abuse, and is seen for evaluation. He tells me that after we shorten the x-rays of his lungs on the last visit he has not had another cigarette. We congratulated. He has been under some distressed related to his wife she had to be taken back to the hospital when her leg began swelling and she developed a blood clot. She was subsequently placed after 9 days in the hospital at [facility] but after 2 weeks he took her out because of the concern for coronavirus. She is now at home receiving rehab. Her leg is starting to swell again and is concerned. He also complains of constipation and has not seen the GI doctor because of the things that went on with his wife. Prior to Admission medications    Medication Sig Start Date End Date Taking? Authorizing Provider   albuterol (PROVENTIL HFA, VENTOLIN HFA, PROAIR HFA) 90 mcg/actuation inhaler Take 2 Puffs by inhalation every four (4) hours as needed for Wheezing. 12/18/19  Yes Nabil Pavon MD   methadone (DOLOPHINE) 10 mg/mL solution Take 56 mg by mouth daily. Yes Provider, Historical   aspirin 81 mg chewable tablet Take 81 mg by mouth daily. Yes Provider, Historical   multivitamin (ONE A DAY) tablet Take 1 Tab by mouth daily. Yes Provider, Historical   LORazepam (ATIVAN) 0.5 mg tablet Take  by mouth nightly as needed for Anxiety.    Yes Provider, Historical     No Known Allergies    REVIEW OF SYSTEMS as noted below except that noted in subjective:  General: negative for - chills or fever  ENT: negative for - headaches, nasal congestion or tinnitus  Respiratory: negative for - cough, hemoptysis, shortness of breath or wheezing  Cardiovascular : negative for - chest pain, edema, palpitations or shortness of breath  Gastrointestinal: negative for - abdominal pain, blood in stools, heartburn or nausea/vomiting  Genito-Urinary: no dysuria, trouble voiding, or hematuria  Musculoskeletal: negative for - gait disturbance, joint pain, joint stiffness or joint swelling  Neurological: no TIA or stroke symptoms  Hematologic: no bruises, no bleeding, no swollen glands  Integument: no lumps, mole changes, nail changes or rash  Endocrine:no malaise/lethargy or unexpected weight changes      Patient Active Problem List    Diagnosis Date Noted    Dyslipidemia     [facility] ([facility])     Depression     [facility] maintenance therapy patient ([facility]     Mass of pancreas 04/18/2019     Current Outpatient Medications   Medication Sig Dispense Refill    albuterol (PROVENTIL HFA, VENTOLIN HFA, PROAIR HFA) 90 mcg/actuation inhaler Take 2 Puffs by inhalation every four (4) hours as needed for Wheezing. 1 Inhaler 11    methadone (DOLOPHINE) 10 mg/mL solution Take 56 mg by mouth daily.  aspirin 81 mg chewable tablet Take 81 mg by mouth daily.  multivitamin (ONE A DAY) tablet Take 1 Tab by mouth daily.  LORazepam (ATIVAN) 0.5 mg tablet Take  by mouth nightly as needed for Anxiety. No Known Allergies  Past Medical History:   Diagnosis Date    Depression     depression    Dyslipidemia     [facility] 12/18/19    Hep C    Mass of pancreas 04/18/2019    eus 4-18-19  w. savanah arrieta md gi - cytology + lymphoid vs neuroendocrine tumor    Methadone maintenance therapy patient [facility])      History reviewed. No pertinent surgical history.   Family History   Problem Relation Age of Onset    Cancer Father      Social History Tobacco Use    Smoking status: Former Smoker     Packs/day: 1.00     Last attempt to quit: 2020     Years since quittin.0    Smokeless tobacco: Never Used   Substance Use Topics    Alcohol use: Not Currently     Frequency: Never     Comment: occasional       ROS        Objective:   Vital Signs: (As obtained by patient/caregiver at home)  There were no vitals taken for this visit. [INSTRUCTIONS:  \"[x]\" Indicates a positive item  \"[]\" Indicates a negative item  -- DELETE ALL ITEMS NOT EXAMINED]    Constitutional: [x] Appears well-developed and well-nourished [x] No apparent distress      [] Abnormal -     Mental status: [x] Alert and awake  [x] Oriented to person/place/time [x] Able to follow commands    [] Abnormal -     Eyes:   EOM    [x]  Normal    [] Abnormal -   Sclera  [x]  Normal    [] Abnormal -          Discharge [x]  None visible   [] Abnormal -     HENT: [x] Normocephalic, atraumatic  [] Abnormal -   [x] Mouth/Throat: Mucous membranes are moist    External Ears [x] Normal  [] Abnormal -    Neck: [x] No visualized mass [] Abnormal -     Pulmonary/Chest: [x] Respiratory effort normal   [x] No visualized signs of difficulty breathing or respiratory distress        [] Abnormal -      Musculoskeletal:   [x] Normal gait with no signs of ataxia         [x] Normal range of motion of neck        [] Abnormal -     Neurological:        [x] No Facial Asymmetry (Cranial nerve 7 motor function) (limited exam due to video visit)          [x] No gaze palsy        [] Abnormal -          Skin:        [x] No significant exanthematous lesions or discoloration noted on facial skin         [] Abnormal -            Psychiatric:       [x] Normal Affect [] Abnormal -        [x] No Hallucinations    Other pertinent observable physical exam findings:-        We discussed the expected course, resolution and complications of the diagnosis(es) in detail.   Medication risks, benefits, costs, interactions, and alternatives were discussed as indicated. I advised him to contact the office if his condition worsens, changes or fails to improve as anticipated. He expressed understanding with the diagnosis(es) and plan. Joshua Black is a 75 y.o. male being evaluated by a video visit encounter for concerns as above. A caregiver was present when appropriate. Due to this being a TeleHealth encounter ([facility]), evaluation of the following organ systems was limited: Vitals/Constitutional/EENT/Resp/CV/GI//MS/Neuro/Skin/Heme-Lymph-Imm. Pursuant to the emergency declaration under the [facility], [facility] waiver authority and the [employer] and [employer] Act, this Virtual  Visit was conducted, with patient's (and/or legal guardian's) consent, to reduce the patient's risk of exposure to COVID-19 and provide necessary medical care. Services were provided through a video synchronous discussion virtually to substitute for in-person clinic visit. Patient and provider were located at their individual homes. Daryle Forester, MD    Please note that portions of this dictation may have been recorded with voice recognition software. Some unanticipated grammatical, syntax, homophones, and other interpretive errors are inadvertently transcribed by the computer software. An attempt at proof reading has been made to minimize errors and omissions. Please disregard these errors. Thank you.

## 2020-04-22 NOTE — ASSESSMENT & PLAN NOTE
This condition is managed by Specialist.     Lab Results   Component Value Date/Time    ALT (SGPT) 18 04/20/2020 11:01 AM    AST (SGOT) 18 04/20/2020 11:01 AM    Alk.  phosphatase 49 04/20/2020 11:01 AM    Bilirubin, direct 0.18 04/20/2020 11:01 AM    Bilirubin, total 0.8 04/20/2020 11:01 AM    Albumin 4.8 04/20/2020 11:01 AM    Protein, total 6.9 04/20/2020 11:01 AM    HGB 14.3 04/20/2020 11:01 AM    HCT 42.2 04/20/2020 11:01 AM    PLATELET 370 24/07/0764 11:01 AM

## 2020-04-23 NOTE — PROGRESS NOTES
SVR. Tried to call pt. Wife answered and has a multitude of health problems. She later informed me the patient had gone to a part time job. I will mail a letter as she is not on HPI.

## 2020-04-27 ENCOUNTER — DOCUMENTATION ONLY (OUTPATIENT)
Dept: HEMATOLOGY | Age: 65
End: 2020-04-27

## 2020-07-22 ENCOUNTER — OFFICE VISIT (OUTPATIENT)
Dept: INTERNAL MEDICINE CLINIC | Age: 65
End: 2020-07-22

## 2020-07-22 VITALS
HEART RATE: 64 BPM | TEMPERATURE: 98.2 F | BODY MASS INDEX: 24.2 KG/M2 | OXYGEN SATURATION: 97 % | RESPIRATION RATE: 18 BRPM | HEIGHT: 66 IN | DIASTOLIC BLOOD PRESSURE: 80 MMHG | WEIGHT: 150.6 LBS | SYSTOLIC BLOOD PRESSURE: 144 MMHG

## 2020-07-22 DIAGNOSIS — F41.9 ANXIETY AND DEPRESSION: ICD-10-CM

## 2020-07-22 DIAGNOSIS — B18.2 HEP C W/ COMA, CHRONIC: ICD-10-CM

## 2020-07-22 DIAGNOSIS — E78.5 DYSLIPIDEMIA: ICD-10-CM

## 2020-07-22 DIAGNOSIS — F32.A DEPRESSION, UNSPECIFIED DEPRESSION TYPE: ICD-10-CM

## 2020-07-22 DIAGNOSIS — F11.20 METHADONE MAINTENANCE THERAPY PATIENT (HCC): ICD-10-CM

## 2020-07-22 DIAGNOSIS — F32.A ANXIETY AND DEPRESSION: ICD-10-CM

## 2020-07-22 DIAGNOSIS — K86.89 MASS OF PANCREAS: Primary | ICD-10-CM

## 2020-07-22 RX ORDER — ROSUVASTATIN CALCIUM 10 MG/1
10 TABLET, COATED ORAL
Qty: 30 TAB | Refills: 11 | Status: SHIPPED | OUTPATIENT
Start: 2020-07-22

## 2020-07-22 NOTE — PROGRESS NOTES
SPORTS MEDICINE AND PRIMARY [facility]ARE  Sammy Conde MD, [address] [address]  Phone:  [phone]  Fax: [phone]       Chief Complaint   Patient presents with    Follow-up   . SUBJECTIVE:    Tye Mccord is a 77 y.o. male Patient returns today with known history of hepatitis C, methadone maintenance therapy patient, dyslipidemia, depression, and a mass of the pancreas. Since we last saw him, his wife is going to have to go to surgery for a total knee replacement revision. He has also been evicted from the place where he lives and has to be out by the 35th and does not know where he is going yet. He is under a great deal of stress and would like something for the stress. Patient is seen for evaluation. Current Outpatient Medications   Medication Sig Dispense Refill    rosuvastatin (CRESTOR) 10 mg tablet Take 1 Tab by mouth nightly. 30 Tab 11    methadone (DOLOPHINE) 10 mg/mL solution Take 56 mg by mouth daily.  aspirin 81 mg chewable tablet Take 81 mg by mouth daily.  multivitamin (ONE A DAY) tablet Take 1 Tab by mouth daily.  LORazepam (ATIVAN) 0.5 mg tablet Take  by mouth nightly as needed for Anxiety.  polyethylene glycol (MIRALAX) 17 gram packet Take 1 Packet by mouth two (2) times a day. 60 Packet 11    albuterol (PROVENTIL HFA, VENTOLIN HFA, PROAIR HFA) 90 mcg/actuation inhaler Take 2 Puffs by inhalation every four (4) hours as needed for Wheezing. 1 Inhaler 11     Past Medical History:   Diagnosis Date    Depression     depression    Dyslipidemia     [facility]/ [facility], [facility] ([facility] 12/18/2019    Hep C - dr. Maurice Wilson of pancreas 04/18/2019    eus 4-18-19  w. Gunner arrieta md gi - cytology + lymphoid vs neuroendocrine tumor    Methadone maintenance therapy patient [facility])      History reviewed. No pertinent surgical history.   No Known Allergies      REVIEW OF SYSTEMS:  General: negative for - chills or fever  ENT: negative for - headaches, nasal congestion or tinnitus  Respiratory: negative for - cough, hemoptysis, shortness of breath or wheezing  Cardiovascular : negative for - chest pain, edema, palpitations or shortness of breath  Gastrointestinal: negative for - abdominal pain, blood in stools, heartburn or nausea/vomiting  Genito-Urinary: no dysuria, trouble voiding, or hematuria  Musculoskeletal: negative for - gait disturbance, joint pain, joint stiffness or joint swelling  Neurological: no TIA or stroke symptoms  Hematologic: no bruises, no bleeding, no swollen glands  Integument: no lumps, mole changes, nail changes or rash  Endocrine: no malaise/lethargy or unexpected weight changes      Social History     Socioeconomic History    Marital status:      Spouse name: Not on file    Number of children: Not on file    Years of education: Not on file    Highest education level: Not on file   Tobacco Use    Smoking status: Former Smoker     Packs/day: 1.00     Last attempt to quit: 2020     Years since quittin.3    Smokeless tobacco: Never Used   Substance and Sexual Activity    Alcohol use: Not Currently     Frequency: Never     Comment: occasional    Drug use: Not Currently     Comment: quit 40 years ago    Sexual activity: Not Currently   Social History Narrative    Habits:  Occasional alcohol use. 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 He continues to abuse cigarettes, claims to be trying to stop. His wife stopped two years ago.         Social History:   The patient is  for the past 25 years. He has two daughters outside his marriage, 36 and 44, and three grandchildren. He completed the 10th grade, but got his GED. He is a retired . Hinduism preference is [employer].         Family History:  Father  48 of cancer, presumably of lung. Mother  80, brain tumor.   No siblings.      Family History   Problem Relation Age of Onset    Cancer Father        OBJECTIVE:    Visit Vitals  /80   Pulse 64   Temp 98.2 °F (36.8 °C) (Oral)   Resp 18   Ht 5' 6\" (1.676 m)   Wt 150 lb 9.6 oz (68.3 kg)   SpO2 97%   BMI 24.31 kg/m²     CONSTITUTIONAL: well , well nourished, appears age appropriate  EYES: perrla, eom intact  ENMT:moist mucous membranes, pharynx clear  NECK: supple. Thyroid normal  RESPIRATORY: Chest: clear bilaterally   CARDIOVASCULAR: Heart: regular rate and rhythm  GASTROINTESTINAL: Abdomen: soft, bowel sounds active  HEMATOLOGIC: no pathological lymph nodes palpated  MUSCULOSKELETAL: Extremities: no edema, pulse 1+   INTEGUMENT: No unusual rashes or suspicious skin lesions noted. Nails appear normal.  NEUROLOGIC: non-focal exam   MENTAL STATUS: alert and oriented, appropriate affect           ASSESSMENT:  1. Mass of pancreas    2. Dyslipidemia    3. Methadone maintenance therapy patient ([facility]    4. Hep C w/ coma, chronic (HCC)    5. Anxiety and depression    6. Depression, unspecified depression type      He is followed by GI for the pancreatic mass and we will refer him back to that physician for follow up and repeat EUS if needed. His cholesterol is controlled with diet alone with an LDL of 166, total cholesterol 241, HDL 58. We started him on Crestor 10 mg daily and he agrees. He is followed by the methadone clinic for maintenance program.    He has been treated for his hepatitis C and presumably he is cured. For the anxiety/depression we will refer him to psychiatry. We will not put him on a benzodiazepine since he is taking methadone. He will be back to see us in three to four months, sooner if needed. I have discussed the diagnosis with the patient and the intended plan as seen in the  orders above. The patient understands and agees with the plan.   The patient has   received an after visit summary and questions were answered concerning  future plans  Patient labs and/or xrays were reviewed  Past records were reviewed. PLAN:  .  Orders Placed This Encounter    REFERRAL TO GASTROENTEROLOGY    REFERRAL TO PSYCHIATRY    rosuvastatin (CRESTOR) 10 mg tablet       Follow-up and Dispositions    · Return in about 4 months (around 11/22/2020). ATTENTION:   This medical record was transcribed using an electronic medical records system. Although proofread, it may and can contain electronic and spelling errors. Other human spelling and other errors may be present. Corrections may be executed at a later time. Please feel free to contact us for any clarifications as needed.

## 2020-07-22 NOTE — PROGRESS NOTES
Chief Complaint   Patient presents with    Follow-up     1. Have you been to the ER, urgent care clinic since your last visit? Hospitalized since your last visit? No    2. Have you seen or consulted any other health care providers outside of the [address] [place] [address] since your last visit? Include any pap smears or colon screening.  No

## 2020-10-13 ENCOUNTER — OFFICE VISIT (OUTPATIENT)
Dept: HEMATOLOGY | Age: 65
End: 2020-10-13
Payer: MEDICARE

## 2020-10-13 VITALS
HEIGHT: 66 IN | TEMPERATURE: 96.5 F | RESPIRATION RATE: 16 BRPM | SYSTOLIC BLOOD PRESSURE: 112 MMHG | WEIGHT: 156 LBS | HEART RATE: 65 BPM | BODY MASS INDEX: 25.07 KG/M2 | OXYGEN SATURATION: 98 % | DIASTOLIC BLOOD PRESSURE: 64 MMHG

## 2020-10-13 DIAGNOSIS — B18.2 CHRONIC HEPATITIS C WITHOUT HEPATIC COMA (HCC): Primary | ICD-10-CM

## 2020-10-13 PROCEDURE — G9717 DOC PT DX DEP/BP F/U NT REQ: HCPCS | Performed by: NURSE PRACTITIONER

## 2020-10-13 PROCEDURE — 1101F PT FALLS ASSESS-DOCD LE1/YR: CPT | Performed by: NURSE PRACTITIONER

## 2020-10-13 PROCEDURE — G8427 DOCREV CUR MEDS BY ELIG CLIN: HCPCS | Performed by: NURSE PRACTITIONER

## 2020-10-13 PROCEDURE — G8536 NO DOC ELDER MAL SCRN: HCPCS | Performed by: NURSE PRACTITIONER

## 2020-10-13 PROCEDURE — 3017F COLORECTAL CA SCREEN DOC REV: CPT | Performed by: NURSE PRACTITIONER

## 2020-10-13 PROCEDURE — 99213 OFFICE O/P EST LOW 20 MIN: CPT | Performed by: NURSE PRACTITIONER

## 2020-10-13 PROCEDURE — G8419 CALC BMI OUT NRM PARAM NOF/U: HCPCS | Performed by: NURSE PRACTITIONER

## 2020-10-13 NOTE — PROGRESS NOTES
Identified pt with two pt identifiers(name and ). Reviewed record in preparation for visit and have obtained necessary documentation. Chief Complaint   Patient presents with    Hepatitis C     6mo f/u      Vitals:    10/13/20 1336   BP: 112/64   Pulse: 65   Resp: 16   Temp: (!) 96.5 °F (35.8 °C)   TempSrc: Temporal   SpO2: 98%   Weight: 156 lb (70.8 kg)   Height: 5' 6\" (1.676 m)   PainSc:   0 - No pain       Health Maintenance Review: Patient reminded of \"due or due soon\" health maintenance. I have asked the patient to contact his/her primary care provider (PCP) for follow-up on his/her health maintenance. Coordination of Care Questionnaire:  :   1) Have you been to an emergency room, urgent care, or hospitalized since your last visit? If yes, where when, and reason for visit? no       2. Have seen or consulted any other health care provider since your last visit? If yes, where when, and reason for visit? Yes  - last week: Dr. Jarret Jarquni (GI)      Patient is accompanied by self I have received verbal consent from Gena David to discuss any/all medical information while they are present in the room.

## 2020-10-13 NOTE — PROGRESS NOTES
[address] [facility], Tanesha LEZAMA Jerald Brunswick, MD Buel Christine, PA-TIARRA Jarvis, ACNP-BC     Svetlana Moore, [facility]   Marii Millersandee, FNP-C    Radu Negrete, [facility]       Sweta Jamilutado [facility] [address]    at [address], [address] Sridevi Buckley  22. [phone]    FAX: [address] [place] [address], [address]    [phone]    FAX: [phone]     Patient Care Team:  Juan Carlos Oviedo MD as PCP - General (Internal Medicine)  Juan Carlos Oviedo MD as PCP - [facility]  Juan Carlos Oviedo MD (Internal Medicine)    Problem List  Date Reviewed: 11/14/2019          Codes Class Noted    Chronic hepatitis C without hepatic coma [facility]) ICD-10-CM: B18.2  ICD-9-CM: 070.54  10/13/2020        Dyslipidemia ICD-10-CM: I69.2  ICD-9-CM: 272.4  Unknown        Depression ICD-10-CM: F32.9  ICD-9-CM: 833  Unknown    Overview Signed 9/18/2019  4:35 PM by Juan Carlos Oviedo MD     depression             Methadone maintenance therapy patient [facility]) ICD-10-CM: F11.20  ICD-9-CM: 304.00  Unknown        Mass of pancreas ICD-10-CM: K86.89  ICD-9-CM: 577.8  4/18/2019    Overview Signed 9/18/2019  4:22 PM by Juan Carlos Oviedo MD     [facility] 8-13-99  w. md jenaro Rutledge returns to the [address] for management of chronic HCV. The active problem list, all pertinent past medical history, medications, liver histology, radiologic findings and laboratory findings related to the liver disorder were reviewed with the patient. The patient is a 77 y.o.  male who was screened for and subsequently tested positive for chronic HCV in 2019.       An MRCP shows a normal appearing liver. An assessment of liver fibrosis with elastography suggested no fibrosis. The patient has completed 8 weeks of Cloretta Srini (treated 11/2019 -1/2020). He has achieved SVR or cure. The patient has no symptoms which can be attributed to the liver disorder. The patient has not experienced the following symptoms: pain in the right side over the liver, yellowing of the eyes or skin or swelling of the abdomen. The patient completes all daily activities without any functional limitations. ASSESSMENT AND PLAN:  Chronic HCV   Chronic HCV with no fibrosis. Liver transaminases are normal. ALP is normal. Liver function is normal. Total bilirubin is normal. Serum albumin is normal. The platelet count is normal.      Based upon laboratory studies, imaging and elastography, the patient does not appear to have advanced liver disease. The patient has no fibrosis. Chronic HCV treatment  The patient had HCV genotype 1A and has completed 8 weeks of Mavyret (11/2019 -1/2020). He has achieved SVR. I will recheck his viral load today. Screening for hepatocellular carcinoma  HCC screening is not necessary if the patient has no evidence of cirrhosis. Treatment of other medical problems in patients with chronic liver disease  There are no contraindications for the patient to take most medications necessary for treatment of other medical issues. The patient does not consume alcohol on a daily basis. Normal doses of acetaminophen, as recommended on the label of the bottle, are not hepatotoxic except in the setting of daily alcohol use. Even patients with cirrhosis can utilize these for pain. Counseling for alcohol in patients with chronic liver disease  The patient was counseled regarding alcohol consumption and the effect of alcohol on chronic liver disease. The patient does not consume any significant amount of alcohol.     Vaccinations   Vaccination against viral hepatitis A and B is recommended as he has no documented immunity. Routine vaccinations against other bacterial and viral agents can be performed as indicated. Annual flu vaccination should be administered if indicated. ALLERGIES  No Known Allergies    MEDICATIONS  Current Outpatient Medications   Medication Sig    methadone (DOLOPHINE) 10 mg/mL solution Take 56 mg by mouth daily.  aspirin 81 mg chewable tablet Take 81 mg by mouth daily.  multivitamin (ONE A DAY) tablet Take 1 Tab by mouth daily.  rosuvastatin (CRESTOR) 10 mg tablet Take 1 Tab by mouth nightly.  polyethylene glycol (MIRALAX) 17 gram packet Take 1 Packet by mouth two (2) times a day.  albuterol (PROVENTIL HFA, VENTOLIN HFA, PROAIR HFA) 90 mcg/actuation inhaler Take 2 Puffs by inhalation every four (4) hours as needed for Wheezing.  LORazepam (ATIVAN) 0.5 mg tablet Take  by mouth nightly as needed for Anxiety. No current facility-administered medications for this visit. SYSTEM REVIEW NOT RELATED TO LIVER DISEASE OR REVIEWED ABOVE:  Constitution systems: Negative for fever, chills, weight gain, weight loss. Eyes: Negative for visual changes. ENT: Negative for sore throat, painful swallowing. Respiratory: Negative for cough, hemoptysis, SOB. Cardiology: Negative for chest pain, palpitations. GI:  Negative for constipation or diarrhea. : Negative for urinary frequency, dysuria, hematuria, nocturia. Skin: Negative for rash. Hematology: Negative for easy bruising, blood clots. Musculo-skeletal: Negative for back pain, muscle pain, weakness. Neurologic: Negative for headaches, dizziness, vertigo, memory problems not related to HE. Psychology: Negative for anxiety, depression. FAMILY HISTORY:  The father  from lung cancer. The mother  at age 80. Brain tumors. There is no history of liver diseases  There is no history of autoimmune diseases. SOCIAL HISTORY:  The patient is .     The spouse has not been tested for HCV and is negative. The patient has 2 children and 4 grandchildren. The patient 1 PPD. The patient occasionally drinks alcohol. The patient is not currently working. PHYSICAL EXAMINATION:  Visit Vitals  /64   Pulse 65   Temp (!) 96.5 °F (35.8 °C) (Temporal)   Resp 16   Ht 5' 6\" (1.676 m)   Wt 156 lb (70.8 kg)   SpO2 98%   BMI 25.18 kg/m²     General: No acute distress. Eyes: Sclera anicteric. ENT: No oral lesions. Nodes: No adenopathy. Skin: No spider angiomata. No jaundice. No palmar erythema. Respiratory: Lungs clear to auscultation. Cardiovascular: Regular heart rate. No murmurs. No JVD. Abdomen: Soft non-tender, liver size normal to percussion/palpation. Spleen not palpable. No obvious ascites. Extremities: No edema. No muscle wasting. No gross arthritic changes. Neurologic: Alert and oriented. Cranial nerves grossly intact. No asterixis.     LABORATORY STUDIES:  [facility] [address]  [address] Units 4/20/2020 1/13/2020   WBC 3.4 - 10.8 x10E3/uL 6.4 6.7   ANC 1.4 - 7.0 x10E3/uL     HGB 13.0 - 17.7 g/dL 14.3 14.3    - 450 x10E3/uL 236 205   AST 0 - 40 IU/L 18 17   ALT 0 - 44 IU/L 18 14   Alk Phos 39 - 117 IU/L 49 50   Bili, Total 0.0 - 1.2 mg/dL 0.8 0.5   Bili, Direct 0.00 - 0.40 mg/dL 0.18 0.14   Albumin 3.8 - 4.8 g/dL 4.8 4.6   BUN 8 - 27 mg/dL 10 15   Creat 0.76 - 1.27 mg/dL 0.91 0.86   Na 134 - 144 mmol/L 139 141   K 3.5 - 5.2 mmol/L 3.9 4.7   Cl 96 - 106 mmol/L 99 101   CO2 20 - 29 mmol/L 23 23   Glucose 65 - 99 mg/dL 117 (H) 94     Liver [facility] [address] [place] [address] Ref Rng & Units 9/18/2019   WBC 3.4 - 10.8 x10E3/uL 5.9   ANC 1.4 - 7.0 x10E3/uL 2.5   HGB 13.0 - 17.7 g/dL 14.3    - 450 x10E3/uL 288   AST 0 - 40 IU/L 32   ALT 0 - 44 IU/L 31   Alk Phos 39 - 117 IU/L 65   Bili, Total 0.0 - 1.2 mg/dL 0.4   Bili, Direct 0.00 - 0.40 mg/dL    Albumin 3.8 - 4.8 g/dL 4.7   BUN 8 - 27 mg/dL 11   Creat 0.76 - 1.27 mg/dL 0.91   Na 134 - 144 mmol/L 142   K 3.5 - 5.2 mmol/L 5.3 (H)   Cl 96 - 106 mmol/L 99   CO2 20 - 29 mmol/L 27   Glucose 65 - 99 mg/dL 94     SEROLOGIES:  Virology Latest Ref Rng & Units 4/20/2020 1/13/2020 10/21/2019   HCV RNA (IU) IU/mL   18,000,000   HCV RNA, GISSELL, QL Negative Negative Negative Positive (A)     Serologies Latest Ref Rng & Units 10/21/2019 9/18/2019   Hep A Ab, Total Negative Negative    Hep B Surface Ag Negative Negative    Hep B Core Ab, Total Negative Negative    Hep B Surface AB QL  Non Reactive    Hep C Genotype   1a   HCV RT-PCR, Quant IU/mL See Final Results      9/2019. GT 1a    LIVER HISTOLOGY:  11/2019. [facility]. EkPa was 5.1. IQR/med 8%. . The results suggested a fibrosis level of F0. The CAP score suggests no evidence of fatty liver. ENDOSCOPIC PROCEDURES:  Not available or performed    RADIOLOGY:  3/2019. MRCP. Biliary and pancreatic ductal dilatation. OTHER TESTING:  Not available or performed    FOLLOW-UP:  All of the issues listed above in the assessment and plan were discussed with the patient. All questions were answered. The patient expressed a clear understanding of the above. An in-person follow-up visit will be scheduled in 3 months. Likely, that will be his last visit.      Mariposa Cheema, ACNP-BC  Liver [facility] [address] [facility] [place] [facility], [address] Sridevi Buckley  22.  [phone]

## 2020-10-14 LAB
ALBUMIN SERPL-MCNC: 4.2 G/DL (ref 3.8–4.8)
ALP SERPL-CCNC: 44 IU/L (ref 39–117)
ALT SERPL-CCNC: 21 IU/L (ref 0–44)
AST SERPL-CCNC: 21 IU/L (ref 0–40)
BILIRUB DIRECT SERPL-MCNC: 0.14 MG/DL (ref 0–0.4)
BILIRUB SERPL-MCNC: 0.4 MG/DL (ref 0–1.2)
BUN SERPL-MCNC: 11 MG/DL (ref 8–27)
BUN/CREAT SERPL: 12 (ref 10–24)
CALCIUM SERPL-MCNC: 9.3 MG/DL (ref 8.6–10.2)
CHLORIDE SERPL-SCNC: 106 MMOL/L (ref 96–106)
CO2 SERPL-SCNC: 26 MMOL/L (ref 20–29)
CREAT SERPL-MCNC: 0.9 MG/DL (ref 0.76–1.27)
ERYTHROCYTE [DISTWIDTH] IN BLOOD BY AUTOMATED COUNT: 12.9 % (ref 11.6–15.4)
GLUCOSE SERPL-MCNC: 103 MG/DL (ref 65–99)
HCT VFR BLD AUTO: 25.3 % (ref 37.5–51)
HGB BLD-MCNC: 8.5 G/DL (ref 13–17.7)
MCH RBC QN AUTO: 32.7 PG (ref 26.6–33)
MCHC RBC AUTO-ENTMCNC: 33.6 G/DL (ref 31.5–35.7)
MCV RBC AUTO: 97 FL (ref 79–97)
PLATELET # BLD AUTO: 343 X10E3/UL (ref 150–450)
POTASSIUM SERPL-SCNC: 4.4 MMOL/L (ref 3.5–5.2)
PROT SERPL-MCNC: 6.2 G/DL (ref 6–8.5)
RBC # BLD AUTO: 2.6 X10E6/UL (ref 4.14–5.8)
SODIUM SERPL-SCNC: 142 MMOL/L (ref 134–144)
WBC # BLD AUTO: 7.3 X10E3/UL (ref 3.4–10.8)

## 2020-10-15 LAB — HCV RNA SERPL QL NAA+PROBE: NEGATIVE

## 2020-11-23 ENCOUNTER — OFFICE VISIT (OUTPATIENT)
Dept: INTERNAL MEDICINE CLINIC | Age: 65
End: 2020-11-23
Payer: MEDICARE

## 2020-11-23 VITALS
RESPIRATION RATE: 18 BRPM | HEART RATE: 75 BPM | OXYGEN SATURATION: 94 % | HEIGHT: 66 IN | WEIGHT: 148.6 LBS | SYSTOLIC BLOOD PRESSURE: 122 MMHG | DIASTOLIC BLOOD PRESSURE: 82 MMHG | BODY MASS INDEX: 23.88 KG/M2 | TEMPERATURE: 97.8 F

## 2020-11-23 DIAGNOSIS — F11.20 METHADONE MAINTENANCE THERAPY PATIENT (HCC): ICD-10-CM

## 2020-11-23 DIAGNOSIS — R73.02 IGT (IMPAIRED GLUCOSE TOLERANCE): Primary | ICD-10-CM

## 2020-11-23 DIAGNOSIS — J45.20 MILD INTERMITTENT ASTHMA, UNSPECIFIED WHETHER COMPLICATED: ICD-10-CM

## 2020-11-23 DIAGNOSIS — B18.2 CHRONIC HEPATITIS C WITHOUT HEPATIC COMA (HCC): ICD-10-CM

## 2020-11-23 DIAGNOSIS — E78.5 DYSLIPIDEMIA: ICD-10-CM

## 2020-11-23 DIAGNOSIS — F17.210 CIGARETTE SMOKER: ICD-10-CM

## 2020-11-23 PROCEDURE — G8427 DOCREV CUR MEDS BY ELIG CLIN: HCPCS | Performed by: INTERNAL MEDICINE

## 2020-11-23 PROCEDURE — 99214 OFFICE O/P EST MOD 30 MIN: CPT | Performed by: INTERNAL MEDICINE

## 2020-11-23 PROCEDURE — 36415 COLL VENOUS BLD VENIPUNCTURE: CPT | Performed by: INTERNAL MEDICINE

## 2020-11-23 PROCEDURE — G9717 DOC PT DX DEP/BP F/U NT REQ: HCPCS | Performed by: INTERNAL MEDICINE

## 2020-11-23 PROCEDURE — G8420 CALC BMI NORM PARAMETERS: HCPCS | Performed by: INTERNAL MEDICINE

## 2020-11-23 PROCEDURE — G8536 NO DOC ELDER MAL SCRN: HCPCS | Performed by: INTERNAL MEDICINE

## 2020-11-23 PROCEDURE — 3017F COLORECTAL CA SCREEN DOC REV: CPT | Performed by: INTERNAL MEDICINE

## 2020-11-23 PROCEDURE — 1101F PT FALLS ASSESS-DOCD LE1/YR: CPT | Performed by: INTERNAL MEDICINE

## 2020-11-23 RX ORDER — ALBUTEROL SULFATE 90 UG/1
2 AEROSOL, METERED RESPIRATORY (INHALATION)
Qty: 1 INHALER | Refills: 11 | Status: SHIPPED | OUTPATIENT
Start: 2020-11-23

## 2020-11-23 RX ORDER — CEFUROXIME AXETIL 500 MG/1
500 TABLET ORAL 2 TIMES DAILY
Qty: 14 TAB | Refills: 0 | Status: SHIPPED | OUTPATIENT
Start: 2020-11-23 | End: 2021-03-23

## 2020-11-23 NOTE — PROGRESS NOTES
SPORTS MEDICINE AND PRIMARY CARE  Cari Mcgee MD, [address] [address]  Phone:  [phone]  Fax: [phone]       Chief Complaint   Patient presents with    Cough   . SUBJECTIVE:    Xena Easley is a 77 y.o. male Patient returns today with a known history of impaired glucose tolerance, dyslipidemia, methadone maintenance, chronic hepatitis C, treated, impaired glucose tolerance, asthma and cigarette abuse. Since we last saw him, he tried to have a CT scan, but it was rather expensive for him. Dr. Dharmesh Merritt has asked for it. He is going to contact [facility] to see if he can get a scan scheduled and if they will also help on the cost.  His wife is going through a lot. She has had two knee replacements and surgeries on her knee. She is doing better now. He is helping her, however. He is pleased because he has decreased his methadone and gradually getting off of it. Patient complains of cough productive of mucopurulent sputum and is seen for evaluation. Current Outpatient Medications   Medication Sig Dispense Refill    cefUROXime (CEFTIN) 500 mg tablet Take 1 Tab by mouth two (2) times a day. 14 Tab 0    albuterol (PROVENTIL HFA, VENTOLIN HFA, PROAIR HFA) 90 mcg/actuation inhaler Take 2 Puffs by inhalation every four (4) hours as needed for Wheezing. 1 Inhaler 11    rosuvastatin (CRESTOR) 10 mg tablet Take 1 Tab by mouth nightly. 30 Tab 11    polyethylene glycol (MIRALAX) 17 gram packet Take 1 Packet by mouth two (2) times a day. 60 Packet 11    methadone (DOLOPHINE) 10 mg/mL solution Take 56 mg by mouth daily.  aspirin 81 mg chewable tablet Take 81 mg by mouth daily.  multivitamin (ONE A DAY) tablet Take 1 Tab by mouth daily.        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 No pertinent surgical history. No Known Allergies      REVIEW OF SYSTEMS:  General: negative for - chills or fever  ENT: negative for - headaches, nasal congestion or tinnitus  Respiratory: negative for - cough, hemoptysis, shortness of breath or wheezing  Cardiovascular : negative for - chest pain, edema, palpitations or shortness of breath  Gastrointestinal: negative for - abdominal pain, blood in stools, heartburn or nausea/vomiting  Genito-Urinary: no dysuria, trouble voiding, or hematuria  Musculoskeletal: negative for - gait disturbance, joint pain, joint stiffness or joint swelling  Neurological: no TIA or stroke symptoms  Hematologic: no bruises, no bleeding, no swollen glands  Integument: no lumps, mole changes, nail changes or rash  Endocrine: no malaise/lethargy or unexpected weight changes      Social History     Socioeconomic History    Marital status:      Spouse name: Not on file    Number of children: Not on file    Years of education: Not on file    Highest education level: Not on file   Tobacco Use    Smoking status: Former Smoker     Packs/day: 1.00     Years: 50.00     Pack years: 50.00     Last attempt to quit: 2020     Years since quittin.6    Smokeless tobacco: Never Used   Substance and Sexual Activity    Alcohol use: Not Currently     Frequency: Never     Comment: occasional    Drug use: Not Currently     Comment: quit 40 years ago    Sexual activity: Not Currently   Social History Narrative    Habits:  Occasional alcohol use. 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 He continues to abuse cigarettes, claims to be trying to stop. His wife stopped two years ago.         Social History:   The patient is  for the past 25 years.   He has two daughters outside his marriage, 36 and 44, and three grandchildren. He completed the 10th grade, but got his GED. He is a retired . Spiritism preference is [facility] [employer] [employer].         Family History:  Father  48 of cancer, presumably of lung. Mother  80, brain tumor. No siblings.          Family History   Problem Relation Age of Onset    Cancer Father        OBJECTIVE:    Visit Vitals  /82   Pulse 75   Temp 97.8 °F (36.6 °C) (Oral)   Resp 18   Ht 5' 6\" (1.676 m)   Wt 148 lb 9.6 oz (67.4 kg)   SpO2 94%   BMI 23.98 kg/m²     CONSTITUTIONAL: well , well nourished, appears age appropriate  EYES: perrla, eom intact  ENMT:moist mucous membranes, pharynx clear  NECK: supple. Thyroid normal  RESPIRATORY: Chest: clear bilaterally   CARDIOVASCULAR: Heart: regular rate and rhythm  GASTROINTESTINAL: Abdomen: soft, bowel sounds active  HEMATOLOGIC: no pathological lymph nodes palpated  MUSCULOSKELETAL: Extremities: no edema, pulse 1+   INTEGUMENT: No unusual rashes or suspicious skin lesions noted. Nails appear normal.  NEUROLOGIC: non-focal exam   MENTAL STATUS: alert and oriented, appropriate affect           ASSESSMENT:  1. IGT (impaired glucose tolerance)    2. Chronic hepatitis C without hepatic coma ([facility] [address]    3. Methadone maintenance therapy patient ([facility] [address]    4. Dyslipidemia    5. Mild intermittent asthma, unspecified whether complicated    6. Cigarette smoker      Patient returns today with a known history of impaired glucose tolerance with last hemoglobin A1c normal, therefore no further evaluation will be undertaken at this time. Since we last saw him, we completed the course for hepatitis C and therefore he is now free of that. He remains in the methadone maintenance program.    He is on Rosuvastatin for his cholesterol. His last cholesterol value was in September of last year and was elevated. We will check it today to see where he is at with that value.     He has asthma and today has an asthmatic bronchitis. We will give him a course of antibiotics and renew his inhaler. He will be back to see us in four to six months or as needed. We cannot emphasize enough he needs to stop smoking and he says, \"I'm cutting back\". I have discussed the diagnosis with the patient and the intended plan as seen in the  Orders. The patient understands and agees with the plan. The patient has   received an after visit summary and questions were answered concerning  future plans  Patient labs and/or xrays were reviewed  Past records were reviewed. PLAN:  .  Orders Placed This Encounter    HEMOGLOBIN A1C WITH EAG    LIPID PANEL    cefUROXime (CEFTIN) 500 mg tablet    albuterol (PROVENTIL HFA, VENTOLIN HFA, PROAIR HFA) 90 mcg/actuation inhaler       Follow-up and Dispositions    · Return in about 4 months (around 3/23/2021). ATTENTION:   This medical record was transcribed using an electronic medical records system. Although proofread, it may and can contain electronic and spelling errors. Other human spelling and other errors may be present. Corrections may be executed at a later time. Please feel free to contact us for any clarifications as needed.

## 2020-11-23 NOTE — PROGRESS NOTES
1. Have you been to the ER, urgent care clinic since your last visit? Hospitalized since your last visit? No    2. Have you seen or consulted any other health care providers outside of the [address] [place] [address] since your last visit? Include any pap smears or colon screening.  No     Wants to discuss DM

## 2020-11-24 LAB
ALBUMIN SERPL-MCNC: 3.4 G/DL (ref 3.5–5)
ALBUMIN/GLOB SERPL: 0.8 {RATIO} (ref 1.1–2.2)
ALP SERPL-CCNC: 64 U/L (ref 45–117)
ALT SERPL-CCNC: 16 U/L (ref 12–78)
ANION GAP SERPL CALC-SCNC: 3 MMOL/L (ref 5–15)
AST SERPL-CCNC: 14 U/L (ref 15–37)
BILIRUB DIRECT SERPL-MCNC: 0.1 MG/DL (ref 0–0.2)
BILIRUB SERPL-MCNC: 0.4 MG/DL (ref 0.2–1)
BUN SERPL-MCNC: 12 MG/DL (ref 6–20)
BUN/CREAT SERPL: 14 (ref 12–20)
CALCIUM SERPL-MCNC: 9.8 MG/DL (ref 8.5–10.1)
CHLORIDE SERPL-SCNC: 105 MMOL/L (ref 97–108)
CO2 SERPL-SCNC: 30 MMOL/L (ref 21–32)
CREAT SERPL-MCNC: 0.86 MG/DL (ref 0.7–1.3)
ERYTHROCYTE [DISTWIDTH] IN BLOOD BY AUTOMATED COUNT: 16.7 % (ref 11.5–14.5)
EST. AVERAGE GLUCOSE BLD GHB EST-MCNC: 120 MG/DL
GLOBULIN SER CALC-MCNC: 4.1 G/DL (ref 2–4)
GLUCOSE SERPL-MCNC: 122 MG/DL (ref 65–100)
HBA1C MFR BLD: 5.8 % (ref 4–5.6)
HCT VFR BLD AUTO: 36.3 % (ref 36.6–50.3)
HGB BLD-MCNC: 10.9 G/DL (ref 12.1–17)
MCH RBC QN AUTO: 27.9 PG (ref 26–34)
MCHC RBC AUTO-ENTMCNC: 30 G/DL (ref 30–36.5)
MCV RBC AUTO: 93.1 FL (ref 80–99)
NRBC # BLD: 0 K/UL (ref 0–0.01)
NRBC BLD-RTO: 0 PER 100 WBC
PLATELET # BLD AUTO: 617 K/UL (ref 150–400)
PMV BLD AUTO: 10 FL (ref 8.9–12.9)
POTASSIUM SERPL-SCNC: 5.3 MMOL/L (ref 3.5–5.1)
PROT SERPL-MCNC: 7.5 G/DL (ref 6.4–8.2)
RBC # BLD AUTO: 3.9 M/UL (ref 4.1–5.7)
SODIUM SERPL-SCNC: 138 MMOL/L (ref 136–145)
WBC # BLD AUTO: 5.4 K/UL (ref 4.1–11.1)

## 2021-02-03 ENCOUNTER — OFFICE VISIT (OUTPATIENT)
Dept: HEMATOLOGY | Age: 66
End: 2021-02-03
Payer: MEDICARE

## 2021-02-03 VITALS
OXYGEN SATURATION: 95 % | TEMPERATURE: 98.7 F | BODY MASS INDEX: 24.53 KG/M2 | HEART RATE: 89 BPM | HEIGHT: 66 IN | WEIGHT: 152.6 LBS | SYSTOLIC BLOOD PRESSURE: 124 MMHG | RESPIRATION RATE: 18 BRPM | DIASTOLIC BLOOD PRESSURE: 83 MMHG

## 2021-02-03 DIAGNOSIS — Z86.19 HEPATITIS C VIRUS INFECTION CURED AFTER ANTIVIRAL DRUG THERAPY: ICD-10-CM

## 2021-02-03 DIAGNOSIS — R76.8 HEPATITIS C ANTIBODY POSITIVE IN BLOOD: ICD-10-CM

## 2021-02-03 PROCEDURE — G8427 DOCREV CUR MEDS BY ELIG CLIN: HCPCS | Performed by: NURSE PRACTITIONER

## 2021-02-03 PROCEDURE — G9717 DOC PT DX DEP/BP F/U NT REQ: HCPCS | Performed by: NURSE PRACTITIONER

## 2021-02-03 PROCEDURE — 99213 OFFICE O/P EST LOW 20 MIN: CPT | Performed by: NURSE PRACTITIONER

## 2021-02-03 PROCEDURE — 3017F COLORECTAL CA SCREEN DOC REV: CPT | Performed by: NURSE PRACTITIONER

## 2021-02-03 PROCEDURE — 1101F PT FALLS ASSESS-DOCD LE1/YR: CPT | Performed by: NURSE PRACTITIONER

## 2021-02-03 PROCEDURE — G8536 NO DOC ELDER MAL SCRN: HCPCS | Performed by: NURSE PRACTITIONER

## 2021-02-03 PROCEDURE — G8420 CALC BMI NORM PARAMETERS: HCPCS | Performed by: NURSE PRACTITIONER

## 2021-02-03 NOTE — PROGRESS NOTES
Identified pt with two pt identifiers(name and ). Reviewed record in preparation for visit and have obtained necessary documentation. Chief Complaint   Patient presents with    Hepatitis C     f/u      Vitals:    21 1220   BP: 124/83   Pulse: 89   Resp: 18   Temp: 98.7 °F (37.1 °C)   TempSrc: Temporal   SpO2: 95%   Weight: 152 lb 9.6 oz (69.2 kg)   Height: 5' 6\" (1.676 m)   PainSc:   0 - No pain       Health Maintenance Review: Patient reminded of \"due or due soon\" health maintenance. I have asked the patient to contact his/her primary care provider (PCP) for follow-up on his/her health maintenance. Coordination of Care Questionnaire:  :   1) Have you been to an emergency room, urgent care, or hospitalized since your last visit? If yes, where when, and reason for visit? no       2. Have seen or consulted any other health care provider since your last visit? If yes, where when, and reason for visit? NO      Patient is accompanied by self I have received verbal consent from [facility] Yunior to discuss any/all medical information while they are present in the room.

## 2021-02-03 NOTE — PROGRESS NOTES
Emperatriz Hernández MD, Radha Mcneal, Dominic Lefort, MD Curley Ernst, PA-C Scarlette Borders, [facility]     April S Teresa, [facility]   Ruthanna Opitz, FNP-TIARRA Ayala, [facility]       Sweta Pate [address]    at [place] [address] [address], [address] [address]  22.    [phone]    FAX: [address] [place] [address] Drive, [address], [address]    [phone]    FAX: [phone]     Patient Care Team:  Shemar Ochoa MD as PCP - General (Internal Medicine)  Shemar Ochoa MD as PCP - [address] [place] [address] Provider  Shemar Ochoa MD (Internal Medicine)    Problem List  Date Reviewed: 11/14/2019          Codes Class Noted    Hepatitis C antibody positive in blood ICD-10-CM: R76.8  ICD-9-CM: 795.79  2/3/2021    Overview Signed 2/3/2021 10:15 AM by Joselin Mayorga., NP     He has been cured of active HCV but will always test positive for HCV antibody. This does NOT mean active disease. Hepatitis C virus infection cured after antiviral drug therapy ICD-10-CM: Z86.19  ICD-9-CM: V12.09  2/3/2021    Overview Signed 2/3/2021 10:16 AM by Joselin Mayorga., NP     Was treated with 8 weeks of Millie Gasmen (11/2019 - 1/2020) and achieved a cure or SVR.              Mild intermittent asthma without complication XHB-[id number]-OY: H96.92  ICD-9-CM: 493.90  11/23/2020        Asthma ICD-10-CM: J45.909  ICD-9-CM: 493.90  Unknown        Cigarette smoker ICD-10-CM: F17.210  ICD-9-CM: 305.1  11/23/2020        Dyslipidemia ICD-10-CM: E78.5  ICD-9-CM: 272.4  Unknown        Depression ICD-10-CM: F32.9  ICD-9-CM: 293  Unknown    Overview Signed 9/18/2019  4:35 PM by Shemar Ochoa MD     depression             Methadone maintenance therapy patient [facility]) ICD-10-CM: F11.20  ICD-9-CM: 304.00  Unknown        IGT (impaired glucose tolerance) ICD-10-CM: R73.02  ICD-9-CM: 790.22  9/18/2019        Mass of pancreas ICD-10-CM: K86.89  ICD-9-CM: 577.8  4/18/2019    Overview Signed 9/18/2019  4:22 PM by Nathalie Burton MD     [facility] [id number]  w. md jenaro Stevens returns to the [address] for management of chronic HCV. The active problem list, all pertinent past medical history, medications, liver histology, radiologic findings and laboratory findings related to the liver disorder were reviewed with the patient. The patient is a 77 y.o.  male who was screened for and subsequently tested positive for chronic HCV in 2019. An MRCP shows a normal appearing liver. An assessment of liver fibrosis with elastography suggested no fibrosis. The patient has completed 8 weeks of Claryce Cj (treated 11/2019 -1/2020). He has achieved SVR or cure. The patient has no symptoms which can be attributed to the liver disorder. The patient has not experienced the following symptoms: pain in the right side over the liver, yellowing of the eyes or skin or swelling of the abdomen. The patient completes all daily activities without any functional limitations. He is taking care of his wife. She is recovering after taking out an infected knee replacement. ASSESSMENT AND PLAN:  Chronic HCV   Chronic HCV with no fibrosis. Liver transaminases are normal. ALP is normal. Liver function is normal. Total bilirubin is normal. Serum albumin is normal. The platelet count is normal.      Based upon laboratory studies, imaging and elastography, the patient does not appear to have advanced liver disease. The patient has no fibrosis. Chronic HCV treatment  The patient had HCV genotype 1A and has completed 8 weeks of Mavyret (11/2019 -1/2020). He has achieved SVR. I will recheck his viral load today.  He is now 1 year status post end of treatment and requires no further follow up. Screening for hepatocellular carcinoma  HCC screening is not necessary if the patient has no evidence of cirrhosis. Treatment of other medical problems in patients with chronic liver disease  There are no contraindications for the patient to take most medications necessary for treatment of other medical issues. The patient does not consume alcohol on a daily basis. Normal doses of acetaminophen, as recommended on the label of the bottle, are not hepatotoxic except in the setting of daily alcohol use. Even patients with cirrhosis can utilize these for pain. Counseling for alcohol in patients with chronic liver disease  The patient was counseled regarding alcohol consumption and the effect of alcohol on chronic liver disease. The patient does not consume any significant amount of alcohol. Vaccinations   Vaccination against viral hepatitis A and B is recommended as he has no documented immunity. Routine vaccinations against other bacterial and viral agents can be performed as indicated. Annual flu vaccination should be administered if indicated. ALLERGIES  No Known Allergies    MEDICATIONS  Current Outpatient Medications   Medication Sig    cefUROXime (CEFTIN) 500 mg tablet Take 1 Tab by mouth two (2) times a day.  albuterol (PROVENTIL HFA, VENTOLIN HFA, PROAIR HFA) 90 mcg/actuation inhaler Take 2 Puffs by inhalation every four (4) hours as needed for Wheezing.  rosuvastatin (CRESTOR) 10 mg tablet Take 1 Tab by mouth nightly.  polyethylene glycol (MIRALAX) 17 gram packet Take 1 Packet by mouth two (2) times a day.  methadone (DOLOPHINE) 10 mg/mL solution Take 56 mg by mouth daily.  aspirin 81 mg chewable tablet Take 81 mg by mouth daily.  multivitamin (ONE A DAY) tablet Take 1 Tab by mouth daily. No current facility-administered medications for this visit.       SYSTEM REVIEW NOT RELATED TO LIVER DISEASE OR REVIEWED ABOVE:  Constitution systems: Negative for fever, chills, weight gain, weight loss. Eyes: Negative for visual changes. ENT: Negative for sore throat, painful swallowing. Respiratory: Negative for cough, hemoptysis, SOB. Cardiology: Negative for chest pain, palpitations. GI:  Negative for constipation or diarrhea. : Negative for urinary frequency, dysuria, hematuria, nocturia. Skin: Negative for rash. Hematology: Negative for easy bruising, blood clots. Musculo-skeletal: Negative for back pain, muscle pain, weakness. Neurologic: Negative for headaches, dizziness, vertigo, memory problems not related to HE. Psychology: Negative for anxiety, depression. FAMILY HISTORY:  The father  from lung cancer. The mother  at age 80. Brain tumors. There is no history of liver diseases  There is no history of autoimmune diseases. SOCIAL HISTORY:  The patient is . The spouse has not been tested for HCV and is negative. The patient has 2 children and 4 grandchildren. The patient 1 PPD. The patient occasionally drinks alcohol. The patient is not currently working. PHYSICAL EXAMINATION:  There were no vitals taken for this visit. General: No acute distress. Eyes: Sclera anicteric. ENT: No oral lesions. Nodes: No adenopathy. Skin: No spider angiomata. No jaundice. No palmar erythema. Respiratory: Lungs clear to auscultation. Cardiovascular: Regular heart rate. No murmurs. No JVD. Abdomen: Soft non-tender, liver size normal to percussion/palpation. Spleen not palpable. No obvious ascites. Extremities: No edema. No muscle wasting. No gross arthritic changes. Neurologic: Alert and oriented. Cranial nerves grossly intact. No asterixis.     LABORATORY STUDIES:  Liver [place] of [address] & Units 2020 10/13/2020   WBC 4.1 - 11.1 K/uL 5.4 7.3   ANC 1.4 - 7.0 x10E3/uL     HGB 12.1 - 17.0 g/dL 10.9 (L) 8.5 (L)    - 400 K/uL 617 (H) 343   AST 15 - 37 U/L 14 (L) 21   ALT 12 - 78 U/L 16 21   Alk Phos 45 - 117 U/L 64 44   Bili, Total 0.2 - 1.0 MG/DL 0.4 0.4   Bili, Direct 0.0 - 0.2 MG/DL 0.1 0.14   Albumin 3.5 - 5.0 g/dL 3.4 (L) 4.2   BUN 6 - 20 MG/DL 12 11   Creat 0.70 - 1.30 MG/DL 0.86 0.90   Na 136 - 145 mmol/L 138 142   K 3.5 - 5.1 mmol/L 5.3 (H) 4.4   Cl 97 - 108 mmol/L 105 106   CO2 21 - 32 mmol/L 30 26   Glucose 65 - 100 mg/dL 122 (H) 103 (H)     SEROLOGIES:  Virology Latest Ref Rng & Units 10/13/2020 4/20/2020 1/13/2020   HCV RNA (IU) IU/mL      HCV RNA, GISSELL, QL Negative Negative Negative Negative     Virology Latest Ref Rng & Units 10/21/2019   HCV RNA (IU) IU/mL 18,000,000   HCV RNA, GISSELL, QL Negative Positive (A)     Serologies Latest Ref Rng & Units 10/21/2019 9/18/2019   Hep A Ab, Total Negative Negative    Hep B Surface Ag Negative Negative    Hep B Core Ab, Total Negative Negative    Hep B Surface AB QL  Non Reactive    Hep C Genotype   1a   HCV RT-PCR, Quant IU/mL See Final Results      9/2019. GT 1a    LIVER HISTOLOGY:  11/2019. FibroScan performed at [address]. EkPa was 5.1. IQR/med 8%. . The results suggested a fibrosis level of F0. The CAP score suggests no evidence of fatty liver. ENDOSCOPIC PROCEDURES:  Not available or performed    RADIOLOGY:  3/2019. MRCP. Biliary and pancreatic ductal dilatation. OTHER TESTING:  Not available or performed    FOLLOW-UP:  All of the issues listed above in the assessment and plan were discussed with the patient. All questions were answered. The patient expressed a clear understanding of the above. Follow up at the [address] [place] [address] of [place]. When his results finalize, I will send him a letter for his records.      Hannah Abraham, [facility]  Liver [place] [address], [address] [address]  22.  [phone]

## 2021-02-04 LAB
ALBUMIN SERPL-MCNC: 4.4 G/DL (ref 3.8–4.8)
ALP SERPL-CCNC: 61 IU/L (ref 39–117)
ALT SERPL-CCNC: 16 IU/L (ref 0–44)
AST SERPL-CCNC: 20 IU/L (ref 0–40)
BILIRUB DIRECT SERPL-MCNC: 0.18 MG/DL (ref 0–0.4)
BILIRUB SERPL-MCNC: 0.7 MG/DL (ref 0–1.2)
BUN SERPL-MCNC: 9 MG/DL (ref 8–27)
BUN/CREAT SERPL: 12 (ref 10–24)
CALCIUM SERPL-MCNC: 9.8 MG/DL (ref 8.6–10.2)
CHLORIDE SERPL-SCNC: 102 MMOL/L (ref 96–106)
CO2 SERPL-SCNC: 23 MMOL/L (ref 20–29)
CREAT SERPL-MCNC: 0.75 MG/DL (ref 0.76–1.27)
ERYTHROCYTE [DISTWIDTH] IN BLOOD BY AUTOMATED COUNT: 20.1 % (ref 11.6–15.4)
GLUCOSE SERPL-MCNC: 120 MG/DL (ref 65–99)
HCT VFR BLD AUTO: 40.1 % (ref 37.5–51)
HGB BLD-MCNC: 12.7 G/DL (ref 13–17.7)
MCH RBC QN AUTO: 26.5 PG (ref 26.6–33)
MCHC RBC AUTO-ENTMCNC: 31.7 G/DL (ref 31.5–35.7)
MCV RBC AUTO: 84 FL (ref 79–97)
PLATELET # BLD AUTO: 263 X10E3/UL (ref 150–450)
POTASSIUM SERPL-SCNC: 4.5 MMOL/L (ref 3.5–5.2)
PROT SERPL-MCNC: 7.5 G/DL (ref 6–8.5)
RBC # BLD AUTO: 4.8 X10E6/UL (ref 4.14–5.8)
SODIUM SERPL-SCNC: 139 MMOL/L (ref 134–144)
WBC # BLD AUTO: 5.9 X10E3/UL (ref 3.4–10.8)

## 2021-02-06 LAB — HCV RNA SERPL QL NAA+PROBE: NEGATIVE

## 2021-03-23 ENCOUNTER — OFFICE VISIT (OUTPATIENT)
Dept: INTERNAL MEDICINE CLINIC | Age: 66
End: 2021-03-23
Payer: MEDICARE

## 2021-03-23 VITALS
WEIGHT: 153 LBS | RESPIRATION RATE: 18 BRPM | SYSTOLIC BLOOD PRESSURE: 117 MMHG | DIASTOLIC BLOOD PRESSURE: 77 MMHG | TEMPERATURE: 97.9 F | HEIGHT: 66 IN | OXYGEN SATURATION: 98 % | HEART RATE: 72 BPM | BODY MASS INDEX: 24.59 KG/M2

## 2021-03-23 DIAGNOSIS — K86.89 MASS OF PANCREAS: ICD-10-CM

## 2021-03-23 DIAGNOSIS — J45.20 MILD INTERMITTENT ASTHMA, UNSPECIFIED WHETHER COMPLICATED: ICD-10-CM

## 2021-03-23 DIAGNOSIS — Z13.31 SCREENING FOR DEPRESSION: ICD-10-CM

## 2021-03-23 DIAGNOSIS — F17.210 CIGARETTE SMOKER: ICD-10-CM

## 2021-03-23 DIAGNOSIS — Z86.19 HEPATITIS C VIRUS INFECTION CURED AFTER ANTIVIRAL DRUG THERAPY: ICD-10-CM

## 2021-03-23 DIAGNOSIS — F11.20 METHADONE MAINTENANCE THERAPY PATIENT (HCC): ICD-10-CM

## 2021-03-23 DIAGNOSIS — Z00.00 MEDICARE ANNUAL WELLNESS VISIT, SUBSEQUENT: Primary | ICD-10-CM

## 2021-03-23 DIAGNOSIS — E78.5 DYSLIPIDEMIA: ICD-10-CM

## 2021-03-23 DIAGNOSIS — F32.A DEPRESSION, UNSPECIFIED DEPRESSION TYPE: ICD-10-CM

## 2021-03-23 DIAGNOSIS — R73.02 IGT (IMPAIRED GLUCOSE TOLERANCE): ICD-10-CM

## 2021-03-23 PROCEDURE — G9717 DOC PT DX DEP/BP F/U NT REQ: HCPCS | Performed by: INTERNAL MEDICINE

## 2021-03-23 PROCEDURE — 3017F COLORECTAL CA SCREEN DOC REV: CPT | Performed by: INTERNAL MEDICINE

## 2021-03-23 PROCEDURE — G8536 NO DOC ELDER MAL SCRN: HCPCS | Performed by: INTERNAL MEDICINE

## 2021-03-23 PROCEDURE — 99213 OFFICE O/P EST LOW 20 MIN: CPT | Performed by: INTERNAL MEDICINE

## 2021-03-23 PROCEDURE — G8420 CALC BMI NORM PARAMETERS: HCPCS | Performed by: INTERNAL MEDICINE

## 2021-03-23 PROCEDURE — G0439 PPPS, SUBSEQ VISIT: HCPCS | Performed by: INTERNAL MEDICINE

## 2021-03-23 PROCEDURE — G8427 DOCREV CUR MEDS BY ELIG CLIN: HCPCS | Performed by: INTERNAL MEDICINE

## 2021-03-23 PROCEDURE — 1101F PT FALLS ASSESS-DOCD LE1/YR: CPT | Performed by: INTERNAL MEDICINE

## 2021-03-23 RX ORDER — TRAZODONE HYDROCHLORIDE 50 MG/1
50 TABLET ORAL
Qty: 60 TAB | Refills: 3 | Status: SHIPPED | OUTPATIENT
Start: 2021-03-23

## 2021-03-23 NOTE — PATIENT INSTRUCTIONS
Medicare Wellness Visit, Male The best way to live healthy is to have a lifestyle where you eat a well-balanced diet, exercise regularly, limit alcohol use, and quit all forms of tobacco/nicotine, if applicable. Regular preventive services are another way to keep healthy. Preventive services (vaccines, screening tests, monitoring & exams) can help personalize your care plan, which helps you manage your own care. Screening tests can find health problems at the earliest stages, when they are easiest to treat. [facility] follows the current, evidence-based guidelines published by the [facility] Gregory Sherman ([facility]) when recommending preventive services for our patients. Because we follow these guidelines, sometimes recommendations change over time as research supports it. (For example, a prostate screening blood test is no longer routinely recommended for men with no symptoms). Of course, you and your doctor may decide to screen more often for some diseases, based on your risk and co-morbidities (chronic disease you are already diagnosed with). Preventive services for you include: - Medicare offers their members a free annual wellness visit, which is time for you and your primary care provider to discuss and plan for your preventive service needs. Take advantage of this benefit every year! 
-All adults over age 72 should receive the recommended pneumonia vaccines. Current USPSTF guidelines recommend a series of two vaccines for the best pneumonia protection.  
-All adults should have a flu vaccine yearly and tetanus vaccine every 10 years. 
-All adults age 48 and older should receive the shingles vaccines (series of two vaccines).       
-All adults age 38-68 who are overweight should have a diabetes screening test once every three years.  
-Other screening tests & preventive services for persons with diabetes include: an eye exam to screen for diabetic retinopathy, a kidney function test, a foot exam, and stricter control over your cholesterol.  
-Cardiovascular screening for adults with routine risk involves an electrocardiogram (ECG) at intervals determined by the provider.  
-Colorectal cancer screening should be done for adults age 54-65 with no increased risk factors for colorectal cancer. There are a number of acceptable methods of screening for this type of cancer. Each test has its own benefits and drawbacks. Discuss with your provider what is most appropriate for you during your annual wellness visit. The different tests include: colonoscopy (considered the best screening method), a fecal occult blood test, a fecal DNA test, and sigmoidoscopy. 
-All adults born between [facility] should be screened once for Hepatitis C. 
-An Abdominal Aortic Aneurysm (AAA) Screening is recommended for men age 73-68 who has ever smoked in their lifetime. Here is a list of your current Health Maintenance items (your personalized list of preventive services) with a due date: 
Health Maintenance Due Topic Date Due  
 COVID-19 Vaccine (1) Never done  Colorectal Screening  Never done  AAA Screening  Never done

## 2021-03-23 NOTE — PROGRESS NOTES
SPORTS MEDICINE AND PRIMARY CARE  Dyllan Kee MD, [address] [address]  Phone:  [phone]  Fax: [phone]      Chief Complaint   Patient presents with    Annual Wellness Visit         SUBECTIVE:    Nelida Longoria is a 77 y.o. male Patient returns today with a known history of dyslipidemia, mass in the pancreas, followed by Magui Don MD with an EUS on 04/18/19, depression, methadone maintenance patient, cigarette smoker, bronchial asthma, impaired glucose tolerance, hepatitis C, cured, and is seen for evaluation. Patient returns today saying he is stressed, he is depressed. He is taking care of his wife, who has had a failed total knee replacement and is going to have surgery again. She is not able to do much around the house because of pain in the knee. Things are just caving in on him at times. He has to just get up and go out and get some fresh air periodically. He is not sleeping well at night. It is just a lot of stress on him. He remains in the methadone maintenance program, would like to get off of that, but continues with the maintenance program, not doing drugs. He is seen for evaluation. Current Outpatient Medications   Medication Sig Dispense Refill    traZODone (DESYREL) 50 mg tablet Take 1 Tab by mouth nightly. 60 Tab 3    albuterol (PROVENTIL HFA, VENTOLIN HFA, PROAIR HFA) 90 mcg/actuation inhaler Take 2 Puffs by inhalation every four (4) hours as needed for Wheezing. 1 Inhaler 11    rosuvastatin (CRESTOR) 10 mg tablet Take 1 Tab by mouth nightly. 30 Tab 11    polyethylene glycol (MIRALAX) 17 gram packet Take 1 Packet by mouth two (2) times a day. 60 Packet 11    methadone (DOLOPHINE) 10 mg/mL solution Take 56 mg by mouth daily.  aspirin 81 mg chewable tablet Take 81 mg by mouth daily.  multivitamin (ONE A DAY) tablet Take 1 Tab by mouth daily.        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 No pertinent surgical history. No Known Allergies    REVIEW OF SYSTEMS:   No chest pain, no shortness of breath. Social History     Socioeconomic History    Marital status:      Spouse name: Not on file    Number of children: Not on file    Years of education: Not on file    Highest education level: Not on file   Tobacco Use    Smoking status: Former Smoker     Packs/day: 1.00     Years: 50.00     Pack years: 50.00     Quit date: 2020     Years since quittin.9    Smokeless tobacco: Never Used   Substance and Sexual Activity    Alcohol use: Not Currently     Frequency: Never     Comment: occasional    Drug use: Not Currently     Comment: quit 40 years ago    Sexual activity: Not Currently   Social History Narrative    Habits:  Occasional alcohol use. 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 He continues to abuse cigarettes, claims to be trying to stop. His wife stopped two years ago.         Social History:   The patient is  for the past 25 years. He has two daughters outside his marriage, 36 and 44, and three grandchildren. He completed the 10th grade, but got his GED. He is a retired . Zoroastrian preference is [employer]. Wife with failed l tkr         Family History:  Father  48 of cancer, presumably of lung. Mother  80, brain tumor.   No siblings.        r  Family History   Problem Relation Age of Onset    Cancer Father        OBJECTIVE:  Visit Vitals  /77   Pulse 72   Temp 97.9 °F (36.6 °C) (Oral)   Resp 18   Ht 5' 6\" (1.676 m)   Wt 153 lb (69.4 kg)   SpO2 98%   BMI 24.69 kg/m²     ENT: perrla,  eom intact  NECK: supple. Thyroid normal  CHEST: clear to ascultation and percussion   HEART: regular rate and rhythm  ABD: soft, bowel sounds active  EXTREMITIES: no edema, pulse 1+     Office Visit on 02/03/2021   Component Date Value Ref Range Status    Protein, total 02/03/2021 7.5  6.0 - 8.5 g/dL Final    Albumin 02/03/2021 4.4  3.8 - 4.8 g/dL Final    Bilirubin, total 02/03/2021 0.7  0.0 - 1.2 mg/dL Final    Bilirubin, direct 02/03/2021 0.18  0.00 - 0.40 mg/dL Final    Alk. phosphatase 02/03/2021 61  39 - 117 IU/L Final    AST (SGOT) 02/03/2021 20  0 - 40 IU/L Final    ALT (SGPT) 02/03/2021 16  0 - 44 IU/L Final    WBC 02/03/2021 5.9  3.4 - 10.8 x10E3/uL Final    RBC 02/03/2021 4.80  4. 14 - 5.80 x10E6/uL Final    HGB 02/03/2021 12.7* 13.0 - 17.7 g/dL Final    HCT 02/03/2021 40.1  37.5 - 51.0 % Final    MCV 02/03/2021 84  79 - 97 fL Final    MCH 02/03/2021 26.5* 26.6 - 33.0 pg Final    MCHC 02/03/2021 31.7  31.5 - 35.7 g/dL Final    RDW 02/03/2021 20.1* 11.6 - 15.4 % Final    PLATELET 79/10/5624 969  150 - 450 x10E3/uL Final    Glucose 02/03/2021 120* 65 - 99 mg/dL Final    BUN 02/03/2021 9  8 - 27 mg/dL Final    Creatinine 02/03/2021 0.75* 0.76 - 1.27 mg/dL Final    GFR est non-AA 02/03/2021 95  >59 mL/min/1.73 Final    GFR est AA 02/03/2021 110  >59 mL/min/1.73 Final    BUN/Creatinine ratio 02/03/2021 12  10 - 24 Final    Sodium 02/03/2021 139  134 - 144 mmol/L Final    Potassium 02/03/2021 4.5  3.5 - 5.2 mmol/L Final    Chloride 02/03/2021 102  96 - 106 mmol/L Final    CO2 02/03/2021 23  20 - 29 mmol/L Final    Calcium 02/03/2021 9.8  8.6 - 10.2 mg/dL Final    HCV RNA by GISSELL, QL 02/03/2021 Negative  Negative Final    Negative: HCV RNA Not Detected          ASSESSMENT:  1. Medicare annual wellness visit, subsequent    2. Screening for depression    3. Dyslipidemia    4. Mass of pancreas    5. Depression, unspecified depression type    6.  Methadone maintenance therapy patient ([address]    7. Cigarette smoker    8. Mild intermittent asthma, unspecified whether complicated    9. IGT (impaired glucose tolerance)    10. Hepatitis C virus infection cured after antiviral drug therapy      Patient's medical status seems to be clinically stable. I have some concerns, however. He started smoking cigarettes again. He is trying to get off the methadone program.  We encourage him to follow their recommendations. There is a history of dyslipidemia, for which we will check a lipid panel today. There is a history of mass in the pancreas, which is followed by Dr. Shoshana Ruelas, gastroenterology. He has not seen him in follow up. We encouraged him to see him and we will also make the appointment for him. As noted above, he is trying to get off the methadone maintenance program and has requested that they not tell him of his dose and that they gradually reduce his dose. He had stopped cigarettes, but because of the all of the issues surrounding his life, including COVID, he has gone back to cigarettes. He would like him and his wife to get the COVID vaccine and we put him on the list.    There is a history of asthma. Unfortunately, there is no evidence of bronchospasm on exam.    Impaired glucose tolerance will be evaluated with hemoglobin A1c. He will be back to see me in one year, sooner if he has any problems. I have discussed the diagnosis with the patient and the intended plan as seen in the  orders above. The patient understands and agees with the plan. The patient has   received an after visit summary and questions were answered concerning  future plans  Patient labs and/or xrays were reviewed  Past records were reviewed.     PLAN:  .  Orders Placed This Encounter    ANNUAL DEPRESSION SCREEN 8-15 MIN    LIPID PANEL    PROSTATE SPECIFIC AG    URINALYSIS W/ RFLX MICROSCOPIC    HEMOGLOBIN A1C WITH EAG    REFERRAL TO GASTROENTEROLOGY    traZODone (DESYREL) 50 mg tablet Follow-up and Dispositions    · Return in about 4 months (around 7/23/2021). Follow-up and Disposition History                    ATTENTION:   This medical record was transcribed using an electronic medical records system. Although proofread, it may and can contain electronic and spelling errors. Other human spelling and other errors may be present. Corrections may be executed at a later time. Please feel free to contact us for any clarifications as needed.

## 2021-03-23 NOTE — PROGRESS NOTES
1. Have you been to the ER, urgent care clinic since your last visit? Hospitalized since your last visit? No    2. Have you seen or consulted any other health care providers outside of the [address] [place] [address] since your last visit? Include any pap smears or colon screening. No    Wants to discuss depression and stress  This is the Subsequent Medicare Annual Wellness Exam, performed 12 months or more after the Initial AWV or the last Subsequent AWV    I have reviewed the patient's medical history in detail and updated the computerized patient record. Depression Risk Factor Screening:     3 most recent PHQ Screens 1/13/2020   Little interest or pleasure in doing things Not at all   Feeling down, depressed, irritable, or hopeless Not at all   Total Score PHQ 2 0       Alcohol Risk Screen    Do you average more than 1 drink per night or more than 7 drinks a week: No    In the past three months have you have had more than 4 drinks containing alcohol on one occasion: No        Functional Ability and Level of Safety:    Hearing: Hearing is good. Activities of Daily Living: The home contains: no safety equipment. Patient does total self care      Ambulation: with no difficulty     Fall Risk:  Fall Risk Assessment, last 12 mths 3/23/2021   Able to walk? Yes   Fall in past 12 months? 0   Do you feel unsteady?  0   Are you worried about falling 0      Abuse Screen:  Patient is not abused       Cognitive Screening    Has your family/caregiver stated any concerns about your memory: no     Cognitive Screening: not necessary    Assessment/Plan   Education and counseling provided:  Are appropriate based on today's review and evaluation        Health Maintenance Due     Health Maintenance Due   Topic Date Due    COVID-19 Vaccine (1) Never done    Colorectal Cancer Screening Combo  Never done    AAA Screening 73-69 YO Male Smoking Patients  Never done    Medicare Yearly Exam  12/18/2020       Patient Care Team Patient Care Team:  Floyd Miles MD as PCP - General (Internal Medicine)  Floyd Miles MD [facility] PCP [facility]  Floyd Miles MD (Internal Medicine)    History     Patient Active Problem List   Diagnosis Code    Mass of pancreas T47.03    Depression F32.9    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    Dyslipidemia E78.5    IGT (impaired glucose tolerance) R73.02    Mild intermittent asthma without complication U31.68    Asthma J45.909    Cigarette smoker F17.210    Hepatitis C antibody positive in blood R76.8    Hepatitis C virus infection cured after antiviral drug therapy Z86.19     Past Medical History:   Diagnosis Date    Asthma     Depression     depression    Dyslipidemia     IGT (impaired glucose tolerance) 09/18/2019    Mass of pancreas 04/18/2019    eus 4-18-19  alfie arrieta md gi - cytology + lymphoid vs neuroendocrine tumor    Methadone maintenance therapy patient [facility])       History reviewed. No pertinent surgical history. Current Outpatient Medications   Medication Sig Dispense Refill    albuterol (PROVENTIL HFA, VENTOLIN HFA, PROAIR HFA) 90 mcg/actuation inhaler Take 2 Puffs by inhalation every four (4) hours as needed for Wheezing. 1 Inhaler 11    rosuvastatin (CRESTOR) 10 mg tablet Take 1 Tab by mouth nightly. 30 Tab 11    polyethylene glycol (MIRALAX) 17 gram packet Take 1 Packet by mouth two (2) times a day. 60 Packet 11    methadone (DOLOPHINE) 10 mg/mL solution Take 56 mg by mouth daily.  aspirin 81 mg chewable tablet Take 81 mg by mouth daily.  multivitamin (ONE A DAY) tablet Take 1 Tab by mouth daily.  cefUROXime (CEFTIN) 500 mg tablet Take 1 Tab by mouth two (2) times a day.  14 Tab 0     No Known Allergies    Family History   Problem Relation Age of Onset    Cancer Father      Social History     Tobacco Use    Smoking status: Former Smoker     Packs/day: 1.00     Years: 50.00     Pack years: 50.00 Quit date: 2020     Years since quittin.9    Smokeless tobacco: Never Used   Substance Use Topics    Alcohol use: Not Currently     Frequency: Never     Comment: occasional

## 2021-03-25 LAB
APPEARANCE UR: CLEAR
BACTERIA URNS QL MICRO: NEGATIVE /HPF
BILIRUB UR QL: NEGATIVE
CHOLEST SERPL-MCNC: 155 MG/DL
COLOR UR: NORMAL
EPITH CASTS URNS QL MICRO: NORMAL /LPF
EST. AVERAGE GLUCOSE BLD GHB EST-MCNC: 128 MG/DL
GLUCOSE UR STRIP.AUTO-MCNC: NEGATIVE MG/DL
HBA1C MFR BLD: 6.1 % (ref 4–5.6)
HDLC SERPL-MCNC: 74 MG/DL
HDLC SERPL: 2.1 {RATIO} (ref 0–5)
HGB UR QL STRIP: NEGATIVE
HYALINE CASTS URNS QL MICRO: NORMAL /LPF (ref 0–5)
KETONES UR QL STRIP.AUTO: NEGATIVE MG/DL
LDLC SERPL CALC-MCNC: 68 MG/DL (ref 0–100)
LEUKOCYTE ESTERASE UR QL STRIP.AUTO: NEGATIVE
LIPID PROFILE,FLP: NORMAL
NITRITE UR QL STRIP.AUTO: NEGATIVE
PH UR STRIP: 5 [PH] (ref 5–8)
PROT UR STRIP-MCNC: NEGATIVE MG/DL
PSA SERPL-MCNC: 0.3 NG/ML (ref 0.01–4)
RBC #/AREA URNS HPF: NORMAL /HPF (ref 0–5)
SP GR UR REFRACTOMETRY: 1.01 (ref 1–1.03)
TRIGL SERPL-MCNC: 65 MG/DL (ref ?–150)
UROBILINOGEN UR QL STRIP.AUTO: 0.2 EU/DL (ref 0.2–1)
VLDLC SERPL CALC-MCNC: 13 MG/DL
WBC URNS QL MICRO: NORMAL /HPF (ref 0–4)

## 2021-03-31 ENCOUNTER — IMMUNIZATION (OUTPATIENT)
Dept: INTERNAL MEDICINE CLINIC | Age: 66
End: 2021-03-31
Payer: MEDICARE

## 2021-03-31 DIAGNOSIS — Z23 ENCOUNTER FOR IMMUNIZATION: Primary | ICD-10-CM

## 2021-03-31 PROCEDURE — 0001A COVID-19, MRNA, LNP-S, PF, 30MCG/0.3ML DOSE(PFIZER): CPT | Performed by: FAMILY MEDICINE

## 2021-03-31 PROCEDURE — 91300 COVID-19, MRNA, LNP-S, PF, 30MCG/0.3ML DOSE(PFIZER): CPT | Performed by: FAMILY MEDICINE

## 2021-04-21 ENCOUNTER — IMMUNIZATION (OUTPATIENT)
Dept: INTERNAL MEDICINE CLINIC | Age: 66
End: 2021-04-21
Payer: MEDICARE

## 2021-04-21 DIAGNOSIS — Z23 ENCOUNTER FOR IMMUNIZATION: Primary | ICD-10-CM

## 2021-04-21 PROCEDURE — 0002A COVID-19, MRNA, LNP-S, PF, 30MCG/0.3ML DOSE(PFIZER): CPT | Performed by: FAMILY MEDICINE

## 2021-04-21 PROCEDURE — 91300 COVID-19, MRNA, LNP-S, PF, 30MCG/0.3ML DOSE(PFIZER): CPT | Performed by: FAMILY MEDICINE

## 2021-12-10 PROBLEM — D3A.8 NEUROENDOCRINE TUMOR: Status: ACTIVE | Noted: 2021-06-29

## 2022-03-18 PROBLEM — K86.89 MASS OF PANCREAS: Status: ACTIVE | Noted: 2019-04-18

## 2022-03-18 PROBLEM — Z86.19 HEPATITIS C VIRUS INFECTION CURED AFTER ANTIVIRAL DRUG THERAPY: Status: ACTIVE | Noted: 2021-02-03

## 2022-03-18 PROBLEM — D3A.8 NEUROENDOCRINE TUMOR: Status: ACTIVE | Noted: 2021-06-29

## 2022-03-19 PROBLEM — J45.20 MILD INTERMITTENT ASTHMA WITHOUT COMPLICATION: Status: ACTIVE | Noted: 2020-11-23

## 2022-03-19 PROBLEM — R76.8 HEPATITIS C ANTIBODY POSITIVE IN BLOOD: Status: ACTIVE | Noted: 2021-02-03

## 2022-03-19 PROBLEM — R73.02 IGT (IMPAIRED GLUCOSE TOLERANCE): Status: ACTIVE | Noted: 2019-09-18

## 2022-03-19 PROBLEM — F17.210 CIGARETTE SMOKER: Status: ACTIVE | Noted: 2020-11-23

## 2022-11-21 ENCOUNTER — NURSE TRIAGE (OUTPATIENT)
Dept: OTHER | Facility: CLINIC | Age: 67
End: 2022-11-21

## 2022-11-21 NOTE — TELEPHONE ENCOUNTER
Location of patient: [address] [place] from [facility] with [employer]. Subjective: Caller states \"I have been feeling a lot of fatigue. I usually have a lot of energy. But now, low energy. I don't think it's depression or anything like that. I'm also having trouble with frequent urination. Infrequent bowel movements. I also have occasional lightheadedness, too. Not sleeping very well at all. Wife had a heart attack a month or so ago. I have to do everything. \" Pt reports he is stopping smoking, too. Onset: off and on with the bowel movements, worse in the last week with constipation. Has been feeling low energy for these last 3 weeks or so. Temperature:  no fever    What has been tried: nothing so far    Recommended disposition: See PCP within 3 Days    Care advice provided, patient verbalizes understanding; denies any other questions or concerns; instructed to call back for any new or worsening symptoms. Jesenia, Service Expert, is working to get an appt for pt. Attention Provider: Thank you for allowing me to participate in the care of your patient. The patient was connected to triage in response to information provided to the [facility]. Please do not respond through this encounter as the response is not directed to a shared pool.     Reason for Disposition   [1] MILD weakness (i.e., does not interfere with ability to work, go to school, normal activities) AND [2] persists > 1 week    Protocols used: Weakness (Generalized) and Fatigue-ADULT-

## 2022-11-22 ENCOUNTER — OFFICE VISIT (OUTPATIENT)
Dept: INTERNAL MEDICINE CLINIC | Age: 67
End: 2022-11-22
Payer: MEDICARE

## 2022-11-22 VITALS
HEART RATE: 67 BPM | RESPIRATION RATE: 18 BRPM | SYSTOLIC BLOOD PRESSURE: 117 MMHG | DIASTOLIC BLOOD PRESSURE: 75 MMHG | OXYGEN SATURATION: 98 % | HEIGHT: 66 IN | BODY MASS INDEX: 25.07 KG/M2 | WEIGHT: 156 LBS | TEMPERATURE: 98.4 F

## 2022-11-22 DIAGNOSIS — R35.1 NOCTURIA: ICD-10-CM

## 2022-11-22 DIAGNOSIS — R73.02 IGT (IMPAIRED GLUCOSE TOLERANCE): ICD-10-CM

## 2022-11-22 DIAGNOSIS — Z00.00 MEDICARE ANNUAL WELLNESS VISIT, SUBSEQUENT: ICD-10-CM

## 2022-11-22 DIAGNOSIS — Z12.11 SCREEN FOR COLON CANCER: ICD-10-CM

## 2022-11-22 DIAGNOSIS — Z23 ENCOUNTER FOR IMMUNIZATION: Primary | ICD-10-CM

## 2022-11-22 DIAGNOSIS — Z13.39 SCREENING FOR ALCOHOLISM: ICD-10-CM

## 2022-11-22 DIAGNOSIS — E78.5 DYSLIPIDEMIA: ICD-10-CM

## 2022-11-22 DIAGNOSIS — Z13.6 SCREENING FOR AAA (ABDOMINAL AORTIC ANEURYSM): ICD-10-CM

## 2022-11-22 DIAGNOSIS — J45.20 MILD INTERMITTENT ASTHMA WITHOUT COMPLICATION: ICD-10-CM

## 2022-11-22 DIAGNOSIS — F11.20 METHADONE MAINTENANCE THERAPY PATIENT (HCC): ICD-10-CM

## 2022-11-22 DIAGNOSIS — D3A.8 NEUROENDOCRINE TUMOR: ICD-10-CM

## 2022-11-22 DIAGNOSIS — Z87.891 HISTORY OF CIGARETTE SMOKING: ICD-10-CM

## 2022-11-22 DIAGNOSIS — Z86.19 HEPATITIS C VIRUS INFECTION CURED AFTER ANTIVIRAL DRUG THERAPY: ICD-10-CM

## 2022-11-22 DIAGNOSIS — F17.210 CIGARETTE SMOKER: ICD-10-CM

## 2022-11-22 DIAGNOSIS — F32.A DEPRESSION, UNSPECIFIED DEPRESSION TYPE: ICD-10-CM

## 2022-11-22 RX ORDER — POLYETHYLENE GLYCOL 3350 17 G/17G
17 POWDER, FOR SOLUTION ORAL 2 TIMES DAILY
Qty: 60 PACKET | Refills: 11 | Status: SHIPPED | OUTPATIENT
Start: 2022-11-22

## 2022-11-22 NOTE — PROGRESS NOTES
SPORTS MEDICINE AND PRIMARY CARE  Catrachita Patten MD, [address] [address]  Phone:  [phone]  Fax: [phone]      Chief Complaint   Patient presents with    Dizziness    Fatigue         SUBECTIVE:    Fredrick Salas is a 69 y.o. male Patient returns today with a history of dyslipidemia, depression, impaired glucose tolerance, mild intermittent asthma, cigarette addiction, hepatitis C, treated at [facility], methadone maintenance, neuroendocrine tumor of the pancreas, and is seen for evaluation. Patient has been waking up slow and feeling lightheaded for the past couple days. He states he is not sleeping at all and is having trouble with his bowels. He is not taking his cholesterol pills and would like them to be refilled. Since we last saw him, his wife had a heart attack and had triple bypass surgery and is doing okay now. He was to see Dr. Des Ashby MD and was referred to Dr. Des Ashby by Dr. Dl Goldsmith. David Castaneda MD was also involved in his care and he prefers not to see him further. Current Outpatient Medications   Medication Sig Dispense Refill    methadone (DOLOPHINE) 10 mg/mL solution Take 56 mg by mouth daily. aspirin 81 mg chewable tablet Take 81 mg by mouth daily. multivitamin (ONE A DAY) tablet Take 1 Tab by mouth daily. traZODone (DESYREL) 50 mg tablet Take 1 Tab by mouth nightly. (Patient not taking: Reported on 11/22/2022) 60 Tab 3    albuterol (PROVENTIL HFA, VENTOLIN HFA, PROAIR HFA) 90 mcg/actuation inhaler Take 2 Puffs by inhalation every four (4) hours as needed for Wheezing. (Patient not taking: Reported on 11/22/2022) 1 Inhaler 11    rosuvastatin (CRESTOR) 10 mg tablet Take 1 Tab by mouth nightly. (Patient not taking: Reported on 11/22/2022) 30 Tab 11    polyethylene glycol (MIRALAX) 17 gram packet Take 1 Packet by mouth two (2) times a day.  (Patient not taking: Reported on 11/22/2022) 60 Packet 11     Past Medical History:   Diagnosis Date    Asthma     Depression     depression    Dyslipidemia     History of cigarette smoking 1968    50 pyh    IGT (impaired glucose tolerance) 2019    Mass of pancreas 2019    eus 19  alfie arrieta md gi - cytology + lymphoid vs neuroendocrine tumor 6/3/21 marline muniz md -pancreatic neuroendocrine tumor and notes from the state indicate you are pretty presented at the tumor board for tumor board consensus opinion    Methadone maintenance therapy patient [facility])     Neuroendocrine tumor 2021    [facility] [place] -pancreatic neck body fine-needle aspiration: Consistent with well differentiated neuroendocrine tumor. History reviewed. No pertinent surgical history. No Known Allergies    REVIEW OF SYSTEMS:   No suicidal ideation, does not want to particularly see a psychiatrist at this time. Social History     Socioeconomic History    Marital status:    Tobacco Use    Smoking status: Former     Packs/day: 1.00     Years: 50.00     Pack years: 50.00     Types: Cigarettes     Quit date: 2020     Years since quittin.6    Smokeless tobacco: Never   Vaping Use    Vaping Use: Never used   Substance and Sexual Activity    Alcohol use: Not Currently     Comment: occasional    Drug use: Not Currently     Comment: quit 40 years ago    Sexual activity: Not Currently   Social History Narrative    Habits:  Occasional alcohol use. 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 He continues to abuse cigarettes, claims to be trying to stop. His wife stopped two years ago. Social History:   The patient is  for the past 25 years. Cabg 3 He has two daughters outside his marriage, 36 and 44, and three grandchildren. He completed the 10th grade, but got his GED. He is a retired . [employer] preference is [employer].   Wife with failed l tkr         Family History:  Father  48 of cancer, presumably of lung. Mother  80, brain tumor. No siblings. r  Family History   Problem Relation Age of Onset    Cancer Father        OBJECTIVE:  Visit Vitals  /75 (BP 1 Location: Left upper arm, BP Patient Position: Sitting)   Pulse 67   Temp 98.4 °F (36.9 °C) (Oral)   Resp 18   Ht 5' 6\" (1.676 m)   Wt 156 lb (70.8 kg)   SpO2 98%   BMI 25.18 kg/m²     ENT: perrla,  eom intact  NECK: supple. Thyroid normal  CHEST: clear to ascultation and percussion   HEART: regular rate and rhythm  ABD: soft, bowel sounds active  EXTREMITIES: no edema, pulse 1+     No visits with results within 3 Month(s) from this visit. Latest known visit with results is:   Orders Only on 2021   Component Date Value Ref Range Status    Color 2021 YELLOW/STRAW    Final    Color Reference Range: Straw, Yellow or Dark Yellow    Appearance 2021 CLEAR  CLEAR   Final    Specific gravity 2021 1.014  1.003 - 1.030   Final    pH (UA) 2021 5.0  5.0 - 8.0   Final    Protein 2021 Negative  Negative mg/dL Final    Glucose 2021 Negative  Negative mg/dL Final    Ketone 2021 Negative  Negative mg/dL Final    Bilirubin 2021 Negative  Negative   Final    Blood 2021 Negative  Negative   Final    Urobilinogen 2021 0.2  0.2 - 1.0 EU/dL Final    Nitrites 2021 Negative  Negative   Final    Leukocyte Esterase 2021 Negative  Negative   Final    WBC 2021 0-4  0 - 4 /hpf Final    RBC 2021 0-5  0 - 5 /hpf Final    Epithelial cells 2021 FEW  FEW /lpf Final    Comment: Epithelial cell category consists of squamous cells and /or transitional  urothelial cells. Renal tubular cells, if present, are separately identified as  such. Bacteria 2021 Negative  Negative /hpf Final    Hyaline cast 2021 0-2  0 - 5 /lpf Final          ASSESSMENT:  1. Medicare annual wellness visit, subsequent    2. Screening for alcoholism    3. Dyslipidemia    4. Depression, unspecified depression type    5. IGT (impaired glucose tolerance)    6. Mild intermittent asthma without complication    7. Cigarette smoker    8. Methadone maintenance therapy patient ([address]    9. Neuroendocrine tumor    10. Hepatitis C virus infection cured after antiviral drug therapy    11. History of cigarette smoking    12. Nocturia    13. Screen for colon cancer    14. Screening for AAA (abdominal aortic aneurysm)      He stopped the statin. We will check the lipid panel and make a decision about dose of the statin. He has depression, but is not suicidal.  He talked to a psychiatrist in the past, but it was not very successful. The psychiatrist went to sleep on him while he was talking to him. He has impaired glucose tolerance and will check hemoglobin A1c. No evidence of bronchospasm on exam.    He continues to smoke cigarettes, we encouraged him to discontinue the habit. We will get a CT scan screening for a yearly scan. He agrees with the plan. He remains on methadone maintenance therapy. He has a history of a neuroendocrine tumor of pancreas, followed by GI. Hepatitis C infection has been treated. He will be back to see me in four months. For his constipation we offer MiraLAX. We also ask him to be sure and get his colonoscopy. I have discussed the diagnosis with the patient and the intended plan as seen in the  orders above. The patient understands and agees with the plan. The patient has   received an after visit summary and questions were answered concerning  future plans  Patient labs and/or xrays were reviewed  Past records were reviewed.     PLAN:  .  Orders Placed This Encounter    CT LOW DOSE LUNG CANCER SCREENING    US EXAM SCREENING AAA    URINALYSIS W/ RFLX MICROSCOPIC    CBC WITH AUTOMATED DIFF    METABOLIC PANEL, COMPREHENSIVE    LIPID PANEL    PROSTATE SPECIFIC AG    Ricardo Davis [facility]         Follow-up and Dispositions Return in about 4 months (around 3/22/2023). ATTENTION:   This medical record was transcribed using an electronic medical records system. Although proofread, it may and can contain electronic and spelling errors. Other human spelling and other errors may be present. Corrections may be executed at a later time. Please feel free to contact us for any clarifications as needed.

## 2022-11-22 NOTE — PATIENT INSTRUCTIONS
Medicare Wellness Visit, Male    The best way to live healthy is to have a lifestyle where you eat a well-balanced diet, exercise regularly, limit alcohol use, and quit all forms of tobacco/nicotine, if applicable. Regular preventive services are another way to keep healthy. Preventive services (vaccines, screening tests, monitoring & exams) can help personalize your care plan, which helps you manage your own care. Screening tests can find health problems at the earliest stages, when they are easiest to treat. [facility] follows the current, evidence-based guidelines published by the [facility] Gregory Sherman ([facility]) when recommending preventive services for our patients. Because we follow these guidelines, sometimes recommendations change over time as research supports it. (For example, a prostate screening blood test is no longer routinely recommended for men with no symptoms). Of course, you and your doctor may decide to screen more often for some diseases, based on your risk and co-morbidities (chronic disease you are already diagnosed with). Preventive services for you include:  - Medicare offers their members a free annual wellness visit, which is time for you and your primary care provider to discuss and plan for your preventive service needs. Take advantage of this benefit every year!  -All adults over age 72 should receive the recommended pneumonia vaccines. Current USPSTF guidelines recommend a series of two vaccines for the best pneumonia protection.   -All adults should have a flu vaccine yearly and tetanus vaccine every 10 years.  -All adults age 48 and older should receive the shingles vaccines (series of two vaccines).        -All adults age 38-68 who are overweight should have a diabetes screening test once every three years.   -Other screening tests & preventive services for persons with diabetes include: an eye exam to screen for diabetic retinopathy, a kidney function test, a foot exam, and stricter control over your cholesterol.   -Cardiovascular screening for adults with routine risk involves an electrocardiogram (ECG) at intervals determined by the provider.   -Colorectal cancer screening should be done for adults age 54-65 with no increased risk factors for colorectal cancer. There are a number of acceptable methods of screening for this type of cancer. Each test has its own benefits and drawbacks. Discuss with your provider what is most appropriate for you during your annual wellness visit. The different tests include: colonoscopy (considered the best screening method), a fecal occult blood test, a fecal DNA test, and sigmoidoscopy.  -All adults born between [facility] should be screened once for Hepatitis C.  -An Abdominal Aortic Aneurysm (AAA) Screening is recommended for men age 73-68 who has ever smoked in their lifetime.      Here is a list of your current Health Maintenance items (your personalized list of preventive services) with a due date:  Health Maintenance Due   Topic Date Due    Pneumococcal Vaccine (1 - PCV) Never done    Depression Monitoring  Never done    DTaP/Tdap/Td  (1 - Tdap) Never done    Colorectal Screening  Never done    Shingles Vaccine (1 of 2) Never done    Smoker or Former Smoker - [address]  Never done    AAA Screening  Never done    COVID-19 Vaccine (3 - Booster for YODIL series) 06/16/2021    Cholesterol Test   03/23/2022    Yearly Flu Vaccine (1) 08/01/2022

## 2022-11-23 LAB
ALBUMIN SERPL-MCNC: 4.1 G/DL (ref 3.5–5)
ALBUMIN/GLOB SERPL: 1.2 {RATIO} (ref 1.1–2.2)
ALP SERPL-CCNC: 57 U/L (ref 45–117)
ALT SERPL-CCNC: 23 U/L (ref 12–78)
ANION GAP SERPL CALC-SCNC: 4 MMOL/L (ref 5–15)
APPEARANCE UR: CLEAR
AST SERPL-CCNC: 15 U/L (ref 15–37)
BASOPHILS # BLD: 0 K/UL (ref 0–0.1)
BASOPHILS NFR BLD: 1 % (ref 0–1)
BILIRUB SERPL-MCNC: 0.7 MG/DL (ref 0.2–1)
BILIRUB UR QL: NEGATIVE
BUN SERPL-MCNC: 15 MG/DL (ref 6–20)
BUN/CREAT SERPL: 14 (ref 12–20)
CALCIUM SERPL-MCNC: 10.1 MG/DL (ref 8.5–10.1)
CHLORIDE SERPL-SCNC: 104 MMOL/L (ref 97–108)
CHOLEST SERPL-MCNC: 221 MG/DL
CO2 SERPL-SCNC: 33 MMOL/L (ref 21–32)
COLOR UR: NORMAL
CREAT SERPL-MCNC: 1.1 MG/DL (ref 0.7–1.3)
DIFFERENTIAL METHOD BLD: ABNORMAL
EOSINOPHIL # BLD: 0.3 K/UL (ref 0–0.4)
EOSINOPHIL NFR BLD: 4 % (ref 0–7)
ERYTHROCYTE [DISTWIDTH] IN BLOOD BY AUTOMATED COUNT: 13.8 % (ref 11.5–14.5)
GLOBULIN SER CALC-MCNC: 3.4 G/DL (ref 2–4)
GLUCOSE SERPL-MCNC: 103 MG/DL (ref 65–100)
GLUCOSE UR STRIP.AUTO-MCNC: NEGATIVE MG/DL
HCT VFR BLD AUTO: 43.8 % (ref 36.6–50.3)
HDLC SERPL-MCNC: 61 MG/DL
HDLC SERPL: 3.6 {RATIO} (ref 0–5)
HGB BLD-MCNC: 14.2 G/DL (ref 12.1–17)
HGB UR QL STRIP: NEGATIVE
IMM GRANULOCYTES # BLD AUTO: 0 K/UL (ref 0–0.04)
IMM GRANULOCYTES NFR BLD AUTO: 0 % (ref 0–0.5)
KETONES UR QL STRIP.AUTO: NEGATIVE MG/DL
LDLC SERPL CALC-MCNC: 141.6 MG/DL (ref 0–100)
LEUKOCYTE ESTERASE UR QL STRIP.AUTO: NEGATIVE
LYMPHOCYTES # BLD: 2.3 K/UL (ref 0.8–3.5)
LYMPHOCYTES NFR BLD: 37 % (ref 12–49)
MCH RBC QN AUTO: 32.3 PG (ref 26–34)
MCHC RBC AUTO-ENTMCNC: 32.4 G/DL (ref 30–36.5)
MCV RBC AUTO: 99.8 FL (ref 80–99)
MONOCYTES # BLD: 0.6 K/UL (ref 0–1)
MONOCYTES NFR BLD: 9 % (ref 5–13)
NEUTS SEG # BLD: 3 K/UL (ref 1.8–8)
NEUTS SEG NFR BLD: 49 % (ref 32–75)
NITRITE UR QL STRIP.AUTO: NEGATIVE
NRBC # BLD: 0 K/UL (ref 0–0.01)
NRBC BLD-RTO: 0 PER 100 WBC
PH UR STRIP: 6 [PH] (ref 5–8)
PLATELET # BLD AUTO: 219 K/UL (ref 150–400)
PMV BLD AUTO: 10.7 FL (ref 8.9–12.9)
POTASSIUM SERPL-SCNC: 5.4 MMOL/L (ref 3.5–5.1)
PROT SERPL-MCNC: 7.5 G/DL (ref 6.4–8.2)
PROT UR STRIP-MCNC: NEGATIVE MG/DL
PSA SERPL-MCNC: 0.3 NG/ML (ref 0.01–4)
RBC # BLD AUTO: 4.39 M/UL (ref 4.1–5.7)
SODIUM SERPL-SCNC: 141 MMOL/L (ref 136–145)
SP GR UR REFRACTOMETRY: 1.01 (ref 1–1.03)
TRIGL SERPL-MCNC: 92 MG/DL (ref ?–150)
UROBILINOGEN UR QL STRIP.AUTO: 0.2 EU/DL (ref 0.2–1)
VLDLC SERPL CALC-MCNC: 18.4 MG/DL
WBC # BLD AUTO: 6.1 K/UL (ref 4.1–11.1)

## 2022-11-23 RX ORDER — ROSUVASTATIN CALCIUM 20 MG/1
20 TABLET, COATED ORAL
Qty: 30 TABLET | Refills: 11 | Status: SHIPPED | OUTPATIENT
Start: 2022-11-23

## 2022-12-09 ENCOUNTER — HOSPITAL ENCOUNTER (OUTPATIENT)
Dept: ULTRASOUND IMAGING | Age: 67
End: 2022-12-09
Attending: INTERNAL MEDICINE
Payer: MEDICARE

## 2022-12-09 ENCOUNTER — HOSPITAL ENCOUNTER (OUTPATIENT)
Dept: CT IMAGING | Age: 67
End: 2022-12-09
Attending: INTERNAL MEDICINE
Payer: MEDICARE

## 2022-12-09 DIAGNOSIS — Z13.6 SCREENING FOR AAA (ABDOMINAL AORTIC ANEURYSM): ICD-10-CM

## 2022-12-09 DIAGNOSIS — Z87.891 HISTORY OF CIGARETTE SMOKING: ICD-10-CM

## 2022-12-09 PROCEDURE — 76706 US ABDL AORTA SCREEN AAA: CPT

## 2022-12-09 PROCEDURE — 71271 CT THORAX LUNG CANCER SCR C-: CPT

## 2023-05-22 RX ORDER — ASPIRIN 81 MG/1
81 TABLET, CHEWABLE ORAL DAILY
COMMUNITY

## 2023-05-22 RX ORDER — METHADONE HYDROCHLORIDE 10 MG/ML
56 CONCENTRATE ORAL DAILY
COMMUNITY

## 2023-05-22 RX ORDER — TRAZODONE HYDROCHLORIDE 50 MG/1
50 TABLET ORAL
COMMUNITY
Start: 2021-03-23

## 2023-05-22 RX ORDER — ROSUVASTATIN CALCIUM 20 MG/1
1 TABLET, COATED ORAL NIGHTLY
COMMUNITY
Start: 2022-11-23

## 2023-05-22 RX ORDER — POLYETHYLENE GLYCOL 3350 17 G/17G
17 POWDER, FOR SOLUTION ORAL 2 TIMES DAILY
COMMUNITY
Start: 2020-04-22

## 2023-05-22 RX ORDER — ALBUTEROL SULFATE 90 UG/1
2 AEROSOL, METERED RESPIRATORY (INHALATION) EVERY 4 HOURS PRN
COMMUNITY
Start: 2020-11-23

## 2023-10-19 ENCOUNTER — OFFICE VISIT (OUTPATIENT)
Facility: CLINIC | Age: 68
End: 2023-10-19
Payer: MEDICARE

## 2023-10-19 VITALS
TEMPERATURE: 98.3 F | HEIGHT: 66 IN | WEIGHT: 156.2 LBS | HEART RATE: 91 BPM | RESPIRATION RATE: 18 BRPM | DIASTOLIC BLOOD PRESSURE: 80 MMHG | BODY MASS INDEX: 25.1 KG/M2 | SYSTOLIC BLOOD PRESSURE: 122 MMHG | OXYGEN SATURATION: 99 %

## 2023-10-19 DIAGNOSIS — E78.5 DYSLIPIDEMIA: ICD-10-CM

## 2023-10-19 DIAGNOSIS — F32.A DEPRESSION, UNSPECIFIED DEPRESSION TYPE: ICD-10-CM

## 2023-10-19 DIAGNOSIS — R73.02 IGT (IMPAIRED GLUCOSE TOLERANCE): ICD-10-CM

## 2023-10-19 DIAGNOSIS — S09.90XD INJURY OF HEAD, SUBSEQUENT ENCOUNTER: Primary | ICD-10-CM

## 2023-10-19 DIAGNOSIS — F11.20 METHADONE MAINTENANCE THERAPY PATIENT (HCC): ICD-10-CM

## 2023-10-19 DIAGNOSIS — J45.20 MILD INTERMITTENT ASTHMA WITHOUT COMPLICATION: ICD-10-CM

## 2023-10-19 DIAGNOSIS — Z87.891 PERSONAL HISTORY OF TOBACCO USE: ICD-10-CM

## 2023-10-19 DIAGNOSIS — Z87.891 HISTORY OF CIGARETTE SMOKING: ICD-10-CM

## 2023-10-19 PROBLEM — S09.90XA HEAD INJURY: Status: ACTIVE | Noted: 2023-10-19

## 2023-10-19 PROCEDURE — G8427 DOCREV CUR MEDS BY ELIG CLIN: HCPCS | Performed by: INTERNAL MEDICINE

## 2023-10-19 PROCEDURE — G0296 VISIT TO DETERM LDCT ELIG: HCPCS | Performed by: INTERNAL MEDICINE

## 2023-10-19 PROCEDURE — 3017F COLORECTAL CA SCREEN DOC REV: CPT | Performed by: INTERNAL MEDICINE

## 2023-10-19 PROCEDURE — G8419 CALC BMI OUT NRM PARAM NOF/U: HCPCS | Performed by: INTERNAL MEDICINE

## 2023-10-19 PROCEDURE — 1036F TOBACCO NON-USER: CPT | Performed by: INTERNAL MEDICINE

## 2023-10-19 PROCEDURE — G8484 FLU IMMUNIZE NO ADMIN: HCPCS | Performed by: INTERNAL MEDICINE

## 2023-10-19 PROCEDURE — 99214 OFFICE O/P EST MOD 30 MIN: CPT | Performed by: INTERNAL MEDICINE

## 2023-10-19 PROCEDURE — 1123F ACP DISCUSS/DSCN MKR DOCD: CPT | Performed by: INTERNAL MEDICINE

## 2023-10-19 ASSESSMENT — ANXIETY QUESTIONNAIRES
5. BEING SO RESTLESS THAT IT IS HARD TO SIT STILL: 0
6. BECOMING EASILY ANNOYED OR IRRITABLE: 0
1. FEELING NERVOUS, ANXIOUS, OR ON EDGE: 0
7. FEELING AFRAID AS IF SOMETHING AWFUL MIGHT HAPPEN: 0
GAD7 TOTAL SCORE: 0
IF YOU CHECKED OFF ANY PROBLEMS ON THIS QUESTIONNAIRE, HOW DIFFICULT HAVE THESE PROBLEMS MADE IT FOR YOU TO DO YOUR WORK, TAKE CARE OF THINGS AT HOME, OR GET ALONG WITH OTHER PEOPLE: NOT DIFFICULT AT ALL
2. NOT BEING ABLE TO STOP OR CONTROL WORRYING: 0
4. TROUBLE RELAXING: 0
3. WORRYING TOO MUCH ABOUT DIFFERENT THINGS: 0

## 2023-10-19 ASSESSMENT — PATIENT HEALTH QUESTIONNAIRE - PHQ9
4. FEELING TIRED OR HAVING LITTLE ENERGY: 0
8. MOVING OR SPEAKING SO SLOWLY THAT OTHER PEOPLE COULD HAVE NOTICED. OR THE OPPOSITE, BEING SO FIGETY OR RESTLESS THAT YOU HAVE BEEN MOVING AROUND A LOT MORE THAN USUAL: 0
2. FEELING DOWN, DEPRESSED OR HOPELESS: 0
3. TROUBLE FALLING OR STAYING ASLEEP: 0
SUM OF ALL RESPONSES TO PHQ QUESTIONS 1-9: 0
SUM OF ALL RESPONSES TO PHQ9 QUESTIONS 1 & 2: 0
10. IF YOU CHECKED OFF ANY PROBLEMS, HOW DIFFICULT HAVE THESE PROBLEMS MADE IT FOR YOU TO DO YOUR WORK, TAKE CARE OF THINGS AT HOME, OR GET ALONG WITH OTHER PEOPLE: 0
SUM OF ALL RESPONSES TO PHQ QUESTIONS 1-9: 0
9. THOUGHTS THAT YOU WOULD BE BETTER OFF DEAD, OR OF HURTING YOURSELF: 0
5. POOR APPETITE OR OVEREATING: 0
1. LITTLE INTEREST OR PLEASURE IN DOING THINGS: 0
6. FEELING BAD ABOUT YOURSELF - OR THAT YOU ARE A FAILURE OR HAVE LET YOURSELF OR YOUR FAMILY DOWN: 0
7. TROUBLE CONCENTRATING ON THINGS, SUCH AS READING THE NEWSPAPER OR WATCHING TELEVISION: 0

## 2023-10-19 ASSESSMENT — COLUMBIA-SUICIDE SEVERITY RATING SCALE - C-SSRS
4. HAVE YOU HAD THESE THOUGHTS AND HAD SOME INTENTION OF ACTING ON THEM?: NO
5. HAVE YOU STARTED TO WORK OUT OR WORKED OUT THE DETAILS OF HOW TO KILL YOURSELF? DO YOU INTEND TO CARRY OUT THIS PLAN?: NO
7. DID THIS OCCUR IN THE LAST THREE MONTHS: NO
3. HAVE YOU BEEN THINKING ABOUT HOW YOU MIGHT KILL YOURSELF?: NO

## 2023-11-03 RX ORDER — ALBUTEROL SULFATE 90 UG/1
2 AEROSOL, METERED RESPIRATORY (INHALATION) EVERY 4 HOURS PRN
Qty: 18 G | Refills: 11 | Status: SHIPPED | OUTPATIENT
Start: 2023-11-03

## 2023-12-12 NOTE — PROGRESS NOTES
Encounter Date: 12/12/2023       History     Chief Complaint   Patient presents with    Hip Pain     Trip and fall, left hip pain       73-year-old female presents today with left foot pain after fall.   Per EMS patient tripped over a trailer hitch and fell onto her left hip.  Did not hit her head or lose consciousness.  Denies any anticoagulation.  Per EMS left leg is not shortened or externally rotated however patient is nonambulatory.  Patient reports she was able to hop on her right leg but is not able to put any weight on her left.     The history is provided by the patient and the EMS personnel. No  was used.     Review of patient's allergies indicates:   Allergen Reactions    Sulfa (sulfonamide antibiotics) Rash     Past Medical History:   Diagnosis Date    Basal cell carcinoma     Diabetes mellitus, type 2     History of colonic polyps     HTN (hypertension)     Hyperlipidemia      Past Surgical History:   Procedure Laterality Date    ARTHROSCOPY OF KNEE Right 2013    BREAST BIOPSY      COLONOSCOPY  05/2017    EYE SURGERY Right 05/2021    CATARACT    KNEE ARTHROSCOPY W/ MENISCECTOMY Left 9/13/2023    Procedure: ARTHROSCOPY, KNEE, WITH MENISCECTOMY;  Surgeon: Sachin Tong MD;  Location: [facility] OR;  Service: Orthopedics;  Laterality: Left;    TRIGGER FINGER RELEASE Left 10/26/2022    Procedure: RELEASE, TRIGGER FINGER;  Surgeon: Sachin Tong MD;  Location: [facility] OR;  Service: Orthopedics;  Laterality: Left;     Family History   Problem Relation Age of Onset    Colon cancer Mother 76    Hypertension Mother     Obesity Mother     Prostate cancer Father     Breast cancer Neg Hx      Social History     Tobacco Use    Smoking status: Never    Smokeless tobacco: Never   Substance Use Topics    Alcohol use: Yes     Comment: occasional    Drug use: Never     Review of Systems    Physical Exam     Initial Vitals [12/12/23 1407]   BP Pulse Resp Temp SpO2   (!) 170/93 88 20 98 °F (36.7 °C) 96 %     Phillip Bradley is a 69 y.o. male    Chief Complaint   Patient presents with    Dizziness    Fatigue     1. Have you been to the ER, urgent care clinic since your last visit? Hospitalized since your last visit? No    2. Have you seen or consulted any other health care providers outside of the [address] [place] [address] since your last visit? Include any pap smears or colon screening. No  This is the Subsequent Medicare Annual Wellness Exam, performed 12 months or more after the Initial AWV or the last Subsequent AWV    I have reviewed the patient's medical history in detail and updated the computerized patient record. Assessment/Plan   Education and counseling provided:  Are appropriate based on today's review and evaluation    1. Medicare annual wellness visit, subsequent  2.  Screening for alcoholism     Depression Risk Factor Screening     3 most recent PHQ Screens 11/22/2022   Little interest or pleasure in doing things More than half the days   Feeling down, depressed, irritable, or hopeless Nearly every day   Total Score PHQ 2 5   Trouble falling or staying asleep, or sleeping too much Nearly every day   Feeling tired or having little energy Nearly every day   Poor appetite, weight loss, or overeating More than half the days   Feeling bad about yourself - or that you are a failure or have let yourself or your family down Not at all   Trouble concentrating on things such as school, work, reading, or watching TV Not at all   Moving or speaking so slowly that other people could have noticed; or the opposite being so fidgety that others notice Not at all   Thoughts of being better off dead, or hurting yourself in some way Not at all   PHQ 9 Score 13   How difficult have these problems made it for you to do your work, take care of your home and get along with others Somewhat difficult       Alcohol & Drug Abuse Risk Screen    Do you average more than 1 drink per night or more than 7 drinks a week: Yes    In   MAP       --         Physical Exam    Nursing note and vitals reviewed.  Constitutional: She appears well-developed. She is not diaphoretic. No distress.   HENT:   Head: Normocephalic and atraumatic.   Nose: Nose normal.   Eyes: EOM are normal. No scleral icterus.   Neck: Neck supple.   Normal range of motion.  Cardiovascular:  Normal rate, regular rhythm, normal heart sounds and intact distal pulses.     Exam reveals no gallop and no friction rub.       No murmur heard.  Pulmonary/Chest: Breath sounds normal. No stridor. No respiratory distress. She has no wheezes. She has no rhonchi. She has no rales.   Abdominal: Abdomen is soft. Bowel sounds are normal. She exhibits no distension. There is no abdominal tenderness. There is no rebound and no guarding.   Musculoskeletal:      Cervical back: Normal range of motion and neck supple.      Comments:  Decreased range of motion left hip secondary to pain.  Warm well perfused distally.  2+ pulses. Full range of motion of the ankle.  Minimal tenderness over left hip and inguinal region.     Neurological: She is alert and oriented to person, place, and time. She has normal strength. No cranial nerve deficit or sensory deficit. GCS score is 15. GCS eye subscore is 4. GCS verbal subscore is 5. GCS motor subscore is 6.   Skin: Skin is warm and dry. Capillary refill takes less than 2 seconds. No rash noted.   Psychiatric: She has a normal mood and affect.         ED Course   Procedures  Labs Reviewed   CBC W/ AUTO DIFFERENTIAL - Abnormal; Notable for the following components:       Result Value    Gran % 80.0 (*)     Lymph % 12.5 (*)     All other components within normal limits   COMPREHENSIVE METABOLIC PANEL - Abnormal; Notable for the following components:    Glucose 126 (*)     BUN 24 (*)     Total Bilirubin 1.1 (*)     eGFR 47 (*)     All other components within normal limits          Imaging Results              X-Ray Pelvis Routine AP (Final result)  Result time  the past three months have you have had more than 4 drinks containing alcohol on one occasion: Yes     Opioid Risk: (Low risk score <55, High risk score ?55)  Opioid risk score: The patient doesn't have any registry metric data available      Click here to complete the Controlled Substance Monitoring SmartForm        Last PDMP [place] as Reviewed:  Review User Review Instant Review Result            Functional Ability and Level of Safety    Hearing: Hearing is good. Activities of Daily Living: The home contains: no safety equipment. Patient does total self care      Ambulation: with no difficulty     Fall Risk:  Fall Risk Assessment, last 12 mths 11/22/2022   Able to walk? Yes   Fall in past 12 months? 0   Do you feel unsteady? 0   Are you worried about falling -      Abuse Screen:  Patient is not abused       Cognitive Screening    Has your family/caregiver stated any concerns about your memory: no     Cognitive Screening: Normal - Verbal Fluency Test    Health Maintenance Due     Health Maintenance Due   Topic Date Due    Pneumococcal 65+ years (1 - PCV) Never done    Depression Monitoring  Never done    DTaP/Tdap/Td series (1 - Tdap) Never done    Colorectal Cancer Screening Combo  Never done    Shingrix Vaccine Age 50> (1 of 2) Never done    Low dose CT lung screening  Never done    AAA Screening 73-69 YO Male Smoking Patients  Never done    COVID-19 Vaccine (3 - Booster for Pfizer series) 06/16/2021    Lipid Screen  03/23/2022    Flu Vaccine (1) 08/01/2022       Patient Care Team   Patient Care Team:  Chilo Zhong MD as PCP - General (Internal Medicine Physician)  Chilo Zhong MD as PCP [facility]  Chilo Zhong MD (Internal Medicine Physician)    History     Patient Active Problem List   Diagnosis Code    Mass of pancreas K86.89    Depression F32. A    Methadone maintenance therapy patient ([facility] [place] [address] F11.20    Dyslipidemia E78.5    IGT (impaired glucose tolerance) 12/12/23 14:50:13      Final result by Abril Miranda MD (12/12/23 14:50:13)                   Impression:      Acute left femoral neck fracture.      Electronically signed by: Abril Miranda  Date:    12/12/2023  Time:    14:50               Narrative:    EXAMINATION:  XR PELVIS ROUTINE AP    CLINICAL HISTORY:  left hip injury;    TECHNIQUE:  AP view of the pelvis was performed.    COMPARISON:  None.    FINDINGS:  An AP view of the pelvis was obtained.  The left hip is internally rotated, and there is an acute femoral neck fracture, better demonstrated on today's views of the femur.  It is mildly comminuted.  The right hip, sacroiliac joints, pubic symphysis are intact.                                       X-Ray Knee 2 View Left (Final result)  Result time 12/12/23 14:51:09      Final result by Abril Miranda MD (12/12/23 14:51:09)                   Impression:      Degenerative changes with no acute bony abnormality.      Electronically signed by: Abril Miranda  Date:    12/12/2023  Time:    14:51               Narrative:    EXAMINATION:  XR KNEE 1 OR 2 VIEW LEFT    CLINICAL HISTORY:  left hip injury;    TECHNIQUE:  One or two views of the left knee were performed.    COMPARISON:  06/29/2023    FINDINGS:  There are mild pancompartmental degenerative changes at the knee with no evidence of a fracture.  Trace knee joint effusion is questioned.                                       X-Ray Femur 2 View Left (Final result)  Result time 12/12/23 14:49:15      Final result by Abril Miranda MD (12/12/23 14:49:15)                   Impression:      Left femoral neck fracture.      Electronically signed by: Abril Miranda  Date:    12/12/2023  Time:    14:49               Narrative:    EXAMINATION:  XR FEMUR 2 VIEW LEFT    CLINICAL HISTORY:  left hip injury;    TECHNIQUE:  AP and lateral views of the left femur were performed.    COMPARISON:  Knee x-rays of 06/29/2023    FINDINGS:  There  R73.02    Mild intermittent asthma without complication X14.13    Asthma J45.909    Cigarette smoker F17.210    Hepatitis C antibody positive in blood R76.8    Hepatitis C virus infection cured after antiviral drug therapy Z86.19    Neuroendocrine tumor D3A.8     Past Medical History:   Diagnosis Date    Asthma     Depression     depression    Dyslipidemia     IGT (impaired glucose tolerance) 09/18/2019    Mass of pancreas 04/18/2019    eus 4-18-19  alfie arrieta md gi - cytology + lymphoid vs neuroendocrine tumor 6/3/21 marline muniz md -pancreatic neuroendocrine tumor and notes from the state indicate you are pretty presented at the tumor board for tumor board consensus opinion    Methadone maintenance therapy patient [facility])     Neuroendocrine tumor 06/29/2021    [facility] [place] -pancreatic neck body fine-needle aspiration: Consistent with well differentiated neuroendocrine tumor. History reviewed. No pertinent surgical history. Current Outpatient Medications   Medication Sig Dispense Refill    methadone (DOLOPHINE) 10 mg/mL solution Take 56 mg by mouth daily. aspirin 81 mg chewable tablet Take 81 mg by mouth daily. multivitamin (ONE A DAY) tablet Take 1 Tab by mouth daily. traZODone (DESYREL) 50 mg tablet Take 1 Tab by mouth nightly. (Patient not taking: Reported on 11/22/2022) 60 Tab 3    albuterol (PROVENTIL HFA, VENTOLIN HFA, PROAIR HFA) 90 mcg/actuation inhaler Take 2 Puffs by inhalation every four (4) hours as needed for Wheezing. (Patient not taking: Reported on 11/22/2022) 1 Inhaler 11    rosuvastatin (CRESTOR) 10 mg tablet Take 1 Tab by mouth nightly. (Patient not taking: Reported on 11/22/2022) 30 Tab 11    polyethylene glycol (MIRALAX) 17 gram packet Take 1 Packet by mouth two (2) times a day.  (Patient not taking: Reported on 11/22/2022) 60 Packet 11     No Known Allergies    Family History   Problem Relation Age of Onset    Cancer Father      Social History     Tobacco Use Smoking status: Former     Packs/day: 1.00     Years: 50.00     Pack years: 50.00     Types: Cigarettes     Quit date: 2020     Years since quittin.6    Smokeless tobacco: Never   Substance Use Topics    Alcohol use: Not Currently     Comment: occasional         Roel Mckinney MA is an acute left femoral neck fracture.  The remainder of the femur is intact.  There are minor degenerative changes at the knee.                                       Medications   acetaminophen tablet 1,000 mg (1,000 mg Oral Given 12/12/23 1525)   morphine injection 4 mg (4 mg Intravenous Given 12/12/23 1526)     Medical Decision Making  Workup: XR hip  Findings: Closed left femoral neck fracture without dislocation  Consult: Orthopedic Surgery, Dr. Harry, who recommends admission for further workup and management. However patient is already a patient of Dr. Sachin tong's [facility] Medicine discussed with Dr. Tong who requested patient be transferred to [facility] for further management over there.    Patient does not currently demonstrate complications of fracture such as compartment syndrome, arterial or nerve injury.    Interventions: analgesia  Disposition: Admit to [facility]    Patient accepted by Dr. Stewart for further management            Amount and/or Complexity of Data Reviewed  Labs: ordered.  Radiology: ordered. Decision-making details documented in ED Course.    Risk  OTC drugs.  Prescription drug management.  Decision regarding hospitalization.               ED Course as of 12/12/23 1644   Tue Dec 12, 2023   1456 X-Ray Pelvis Routine AP  Impression:     Acute left femoral neck fracture.        Electronically signed by: Abril Miranda  Date:                                            12/12/2023  Time:                                           14:50   [BD]   1456 X-Ray Femur 2 View Left [BD]   1456 Impression:     Left femoral neck fracture.        Electronically signed by: Abril Miranda  Date:                                            12/12/2023  Time:                                           14:49   [BD]   1456 X-Ray Knee 2 View Left [BD]   1456 Impression:     Degenerative changes with no acute bony abnormality.         Electronically signed by: Abril Ulloa Cristobalbraulio  Date:                                            12/12/2023  Time:                                           14:51   [BD]   1513 Called Dr. Harry, orthopedics, left voicemail. Awaiting call back. [BD]      ED Course User Index  [BD] Portillo Bagley MD                           Clinical Impression:  Final diagnoses:  [M25.552] Left hip pain (Primary)  [S72.002A] Closed fracture of left hip, initial encounter          ED Disposition Condition    Admit Stable                Portillo Bagley MD  12/12/23 0381

## 2024-01-23 RX ORDER — ROSUVASTATIN CALCIUM 20 MG/1
TABLET, COATED ORAL NIGHTLY
Qty: 90 TABLET | Refills: 3 | Status: SHIPPED | OUTPATIENT
Start: 2024-01-23

## 2024-07-03 ENCOUNTER — OFFICE VISIT (OUTPATIENT)
Facility: CLINIC | Age: 69
End: 2024-07-03
Payer: MEDICARE

## 2024-07-03 VITALS
HEIGHT: 61 IN | RESPIRATION RATE: 18 BRPM | DIASTOLIC BLOOD PRESSURE: 67 MMHG | HEART RATE: 81 BPM | SYSTOLIC BLOOD PRESSURE: 113 MMHG | WEIGHT: 145.4 LBS | BODY MASS INDEX: 27.45 KG/M2 | TEMPERATURE: 98.1 F

## 2024-07-03 DIAGNOSIS — Z86.19 HEPATITIS C VIRUS INFECTION CURED AFTER ANTIVIRAL DRUG THERAPY: ICD-10-CM

## 2024-07-03 DIAGNOSIS — R73.02 IGT (IMPAIRED GLUCOSE TOLERANCE): ICD-10-CM

## 2024-07-03 DIAGNOSIS — D3A.8 NEUROENDOCRINE TUMOR: ICD-10-CM

## 2024-07-03 DIAGNOSIS — F11.20 METHADONE MAINTENANCE THERAPY PATIENT (HCC): ICD-10-CM

## 2024-07-03 DIAGNOSIS — K86.89 PANCREATIC DUCT DILATED: Primary | ICD-10-CM

## 2024-07-03 DIAGNOSIS — J45.20 MILD INTERMITTENT ASTHMA WITHOUT COMPLICATION: ICD-10-CM

## 2024-07-03 DIAGNOSIS — H53.2 DIPLOPIA: ICD-10-CM

## 2024-07-03 DIAGNOSIS — Z87.891 PERSONAL HISTORY OF TOBACCO USE: ICD-10-CM

## 2024-07-03 DIAGNOSIS — Z12.11 SCREEN FOR COLON CANCER: ICD-10-CM

## 2024-07-03 DIAGNOSIS — R35.1 NOCTURIA: ICD-10-CM

## 2024-07-03 DIAGNOSIS — E78.5 DYSLIPIDEMIA: ICD-10-CM

## 2024-07-03 DIAGNOSIS — E55.9 VITAMIN D DEFICIENCY, UNSPECIFIED: ICD-10-CM

## 2024-07-03 DIAGNOSIS — Z87.891 HISTORY OF CIGARETTE SMOKING: ICD-10-CM

## 2024-07-03 PROCEDURE — G8427 DOCREV CUR MEDS BY ELIG CLIN: HCPCS | Performed by: INTERNAL MEDICINE

## 2024-07-03 PROCEDURE — 99214 OFFICE O/P EST MOD 30 MIN: CPT | Performed by: INTERNAL MEDICINE

## 2024-07-03 PROCEDURE — 3017F COLORECTAL CA SCREEN DOC REV: CPT | Performed by: INTERNAL MEDICINE

## 2024-07-03 PROCEDURE — 4004F PT TOBACCO SCREEN RCVD TLK: CPT | Performed by: INTERNAL MEDICINE

## 2024-07-03 PROCEDURE — G0296 VISIT TO DETERM LDCT ELIG: HCPCS | Performed by: INTERNAL MEDICINE

## 2024-07-03 PROCEDURE — 1123F ACP DISCUSS/DSCN MKR DOCD: CPT | Performed by: INTERNAL MEDICINE

## 2024-07-03 PROCEDURE — G8419 CALC BMI OUT NRM PARAM NOF/U: HCPCS | Performed by: INTERNAL MEDICINE

## 2024-07-03 NOTE — PROGRESS NOTES
Chief Complaint   Patient presents with    Constipation    double vision when bending down     \"Have you been to the ER, urgent care clinic since your last visit?  Hospitalized since your last visit?\"    NO    “Have you seen or consulted any other health care providers outside of [facility] since your last visit?”    NO        “Have you had a colorectal cancer screening such as a colonoscopy/FIT/Cologuard?    NO    No colonoscopy on file  No cologuard on file  No FIT/FOBT on file   No flexible sigmoidoscopy on file         Click Here for Release of Records Request  
Discussed with the patient the current USPSTF guidelines released March 9, 2021 for screening for lung cancer.    For adults aged 50 to 80 years who have a 20 pack-year smoking history and currently smoke or have quit within the past 15 years the grade B recommendation is to:  Screen for lung cancer with low-dose computed tomography (LDCT) every year.  Stop screening once a person has not smoked for 15 years or has a health problem that limits life expectancy or the ability to have lung surgery.    The patient  reports that he has been smoking cigarettes. He started smoking about 9 years ago. He has a 4.8 pack-year smoking history. He has never used smokeless tobacco.. Discussed with patient the risks and benefits of screening, including over-diagnosis, false positive rate, and total radiation exposure.  The patient currently exhibits no signs or symptoms suggestive of lung cancer.  Discussed with patient the importance of compliance with yearly annual lung cancer screenings and willingness to undergo diagnosis and treatment if screening scan is positive.  In addition, the patient was counseled regarding the importance of remaining smoke free and/or total smoking cessation.    Also reviewed the following if the patient has Medicare that as of February 10, 2022, Medicare only covers LDCT screening in patients aged 50-77 with at least a 20 pack-year smoking history who currently smoke or have quit in the last 15 years  
will ask Dr. Dunn to screen him for colon cancer.    For the diplopia we will ask for MRI of the brain.    He will be back to see me in two months, sooner if he has any problems.        I have discussed the diagnosis with the patient and the intended plan as seen in the  Orders.  The patient understands and agees with the plan.  The patient has   received an after visit summary and questions were answered concerning  future plans  Patient labs and/or xrays were reviewed  Past records were reviewed.    PLAN:  Orders Placed This Encounter   Procedures    MRI ABDOMEN W WO CONTRAST MRCP     Standing Status:   Future     Standing Expiration Date:   7/3/2025     Order Specific Question:   STAT Creatinine as needed:     Answer:   Yes     Order Specific Question:   What is the sedation requirement?     Answer:   None    CT Lung Screen (Initial/Annual/Baseline)     Age: Patient is 69 y.o.    Smoking History:   Tobacco Use      Smoking status: Every Day        Packs/day: 0.50        Years: 0.5 packs/day for 9.5 years (4.8 ttl pk-yrs)        Types: Cigarettes        Start date:       Smokeless tobacco: Never        Date of last lung cancer screenin2022     Standing Status:   Future     Standing Expiration Date:   1/3/2026     Order Specific Question:   Is there documentation of shared decision making?     Answer:   Yes     Order Specific Question:   Is this a low dose CT or a routine CT?     Answer:   Low Dose CT [1]     Order Specific Question:   Is this the first (baseline) CT or an annual exam?     Answer:   Annual [2]     Order Specific Question:   Does the patient show any signs or symptoms of lung cancer?     Answer:   No     Order Specific Question:   Smoking Status?     Answer:   Every Day [1]     Order Specific Question:   Pack Years     Answer:   52     Order Specific Question:   Has the patient been exposed to a high level of radon?     Answer:   No [2]     Order Specific Question:   Has the patient

## 2024-07-04 LAB
ALBUMIN SERPL-MCNC: 4.2 G/DL (ref 3.5–5)
ALBUMIN/GLOB SERPL: 1.4 (ref 1.1–2.2)
ALP SERPL-CCNC: 56 U/L (ref 45–117)
ALT SERPL-CCNC: 40 U/L (ref 12–78)
ANION GAP SERPL CALC-SCNC: 5 MMOL/L (ref 5–15)
APPEARANCE UR: CLEAR
AST SERPL-CCNC: 61 U/L (ref 15–37)
BACTERIA URNS QL MICRO: NEGATIVE /HPF
BASOPHILS # BLD: 0.1 K/UL (ref 0–0.1)
BASOPHILS NFR BLD: 1 % (ref 0–1)
BILIRUB SERPL-MCNC: 0.8 MG/DL (ref 0.2–1)
BILIRUB UR QL: NEGATIVE
BUN SERPL-MCNC: 15 MG/DL (ref 6–20)
BUN/CREAT SERPL: 14 (ref 12–20)
CALCIUM SERPL-MCNC: 9.5 MG/DL (ref 8.5–10.1)
CHLORIDE SERPL-SCNC: 103 MMOL/L (ref 97–108)
CHOLEST SERPL-MCNC: 131 MG/DL
CO2 SERPL-SCNC: 29 MMOL/L (ref 21–32)
COLOR UR: NORMAL
CREAT SERPL-MCNC: 1.08 MG/DL (ref 0.7–1.3)
DIFFERENTIAL METHOD BLD: ABNORMAL
EOSINOPHIL # BLD: 0.5 K/UL (ref 0–0.4)
EOSINOPHIL NFR BLD: 10 % (ref 0–7)
EPITH CASTS URNS QL MICRO: NORMAL /LPF
ERYTHROCYTE [DISTWIDTH] IN BLOOD BY AUTOMATED COUNT: 13.2 % (ref 11.5–14.5)
EST. AVERAGE GLUCOSE BLD GHB EST-MCNC: 117 MG/DL
GLOBULIN SER CALC-MCNC: 3.1 G/DL (ref 2–4)
GLUCOSE SERPL-MCNC: 81 MG/DL (ref 65–100)
GLUCOSE UR STRIP.AUTO-MCNC: NEGATIVE MG/DL
HBA1C MFR BLD: 5.7 % (ref 4–5.6)
HCT VFR BLD AUTO: 41 % (ref 36.6–50.3)
HDLC SERPL-MCNC: 55 MG/DL
HDLC SERPL: 2.4 (ref 0–5)
HGB BLD-MCNC: 13.4 G/DL (ref 12.1–17)
HGB UR QL STRIP: NEGATIVE
HYALINE CASTS URNS QL MICRO: NORMAL /LPF (ref 0–5)
IMM GRANULOCYTES # BLD AUTO: 0 K/UL (ref 0–0.04)
IMM GRANULOCYTES NFR BLD AUTO: 0 % (ref 0–0.5)
KETONES UR QL STRIP.AUTO: NEGATIVE MG/DL
LDLC SERPL CALC-MCNC: 61 MG/DL (ref 0–100)
LEUKOCYTE ESTERASE UR QL STRIP.AUTO: NEGATIVE
LYMPHOCYTES # BLD: 2.2 K/UL (ref 0.8–3.5)
LYMPHOCYTES NFR BLD: 42 % (ref 12–49)
MCH RBC QN AUTO: 32.1 PG (ref 26–34)
MCHC RBC AUTO-ENTMCNC: 32.7 G/DL (ref 30–36.5)
MCV RBC AUTO: 98.1 FL (ref 80–99)
MONOCYTES # BLD: 0.5 K/UL (ref 0–1)
MONOCYTES NFR BLD: 10 % (ref 5–13)
NEUTS SEG # BLD: 1.9 K/UL (ref 1.8–8)
NEUTS SEG NFR BLD: 37 % (ref 32–75)
NITRITE UR QL STRIP.AUTO: NEGATIVE
NRBC # BLD: 0 K/UL (ref 0–0.01)
NRBC BLD-RTO: 0 PER 100 WBC
PH UR STRIP: 5 (ref 5–8)
PLATELET # BLD AUTO: 196 K/UL (ref 150–400)
PMV BLD AUTO: 11.3 FL (ref 8.9–12.9)
POTASSIUM SERPL-SCNC: 4.7 MMOL/L (ref 3.5–5.1)
PROT SERPL-MCNC: 7.3 G/DL (ref 6.4–8.2)
PROT UR STRIP-MCNC: NEGATIVE MG/DL
PSA SERPL-MCNC: 0.3 NG/ML (ref 0.01–4)
RBC # BLD AUTO: 4.18 M/UL (ref 4.1–5.7)
RBC #/AREA URNS HPF: NORMAL /HPF (ref 0–5)
SODIUM SERPL-SCNC: 137 MMOL/L (ref 136–145)
SP GR UR REFRACTOMETRY: 1.01 (ref 1–1.03)
TRIGL SERPL-MCNC: 75 MG/DL
UROBILINOGEN UR QL STRIP.AUTO: 0.2 EU/DL (ref 0.2–1)
VLDLC SERPL CALC-MCNC: 15 MG/DL
WBC # BLD AUTO: 5.2 K/UL (ref 4.1–11.1)
WBC URNS QL MICRO: NORMAL /HPF (ref 0–4)

## 2024-07-15 ENCOUNTER — HOSPITAL ENCOUNTER (OUTPATIENT)
Facility: HOSPITAL | Age: 69
Discharge: HOME | End: 2024-07-18
Attending: INTERNAL MEDICINE
Payer: MEDICARE

## 2024-07-15 DIAGNOSIS — E78.5 DYSLIPIDEMIA: ICD-10-CM

## 2024-07-15 DIAGNOSIS — H53.2 DIPLOPIA: ICD-10-CM

## 2024-07-15 DIAGNOSIS — Z87.891 PERSONAL HISTORY OF TOBACCO USE: ICD-10-CM

## 2024-07-15 DIAGNOSIS — K86.89 PANCREATIC DUCT DILATED: ICD-10-CM

## 2024-07-15 PROCEDURE — 71271 CT THORAX LUNG CANCER SCR C-: CPT

## 2024-07-15 PROCEDURE — 2500000003 HC RX 250 WO HCPCS: Performed by: INTERNAL MEDICINE

## 2024-07-15 PROCEDURE — 70553 MRI BRAIN STEM W/O & W/DYE: CPT

## 2024-07-15 PROCEDURE — 74183 MRI ABD W/O CNTR FLWD CNTR: CPT

## 2024-07-15 PROCEDURE — A9575 INJ GADOTERATE MEGLUMI 0.1ML: HCPCS | Performed by: INTERNAL MEDICINE

## 2024-07-15 RX ORDER — GADOTERATE MEGLUMINE 376.9 MG/ML
15 INJECTION INTRAVENOUS ONCE
Status: COMPLETED | OUTPATIENT
Start: 2024-07-15 | End: 2024-07-15

## 2024-07-15 RX ADMIN — GADOTERATE MEGLUMINE 15 ML: 376.9 INJECTION INTRAVENOUS at 18:07

## 2024-07-17 DIAGNOSIS — H53.2 DIPLOPIA: Primary | ICD-10-CM

## 2024-07-18 DIAGNOSIS — K86.89 PANCREATIC DUCT DILATED: Primary | ICD-10-CM

## 2024-07-18 DIAGNOSIS — K83.8 COMMON BILE DUCT DILATION: ICD-10-CM

## 2024-07-19 ENCOUNTER — TELEPHONE (OUTPATIENT)
Facility: CLINIC | Age: 69
End: 2024-07-19

## 2024-07-19 NOTE — TELEPHONE ENCOUNTER
----- Message from Gilbert Patel MD sent at 7/17/2024  7:52 PM EDT -----  Advised patient of the need to see ophthalmology

## 2024-08-14 ENCOUNTER — OFFICE VISIT (OUTPATIENT)
Facility: CLINIC | Age: 69
End: 2024-08-14
Payer: MEDICARE

## 2024-08-14 VITALS
HEIGHT: 61 IN | TEMPERATURE: 97.9 F | BODY MASS INDEX: 28.37 KG/M2 | DIASTOLIC BLOOD PRESSURE: 78 MMHG | HEART RATE: 70 BPM | WEIGHT: 150.3 LBS | RESPIRATION RATE: 17 BRPM | OXYGEN SATURATION: 96 % | SYSTOLIC BLOOD PRESSURE: 117 MMHG

## 2024-08-14 DIAGNOSIS — K83.8 COMMON BILE DUCT DILATION: ICD-10-CM

## 2024-08-14 DIAGNOSIS — Z87.891 HISTORY OF CIGARETTE SMOKING: ICD-10-CM

## 2024-08-14 DIAGNOSIS — Z00.00 MEDICARE ANNUAL WELLNESS VISIT, SUBSEQUENT: Primary | ICD-10-CM

## 2024-08-14 DIAGNOSIS — R73.02 IGT (IMPAIRED GLUCOSE TOLERANCE): ICD-10-CM

## 2024-08-14 DIAGNOSIS — K86.89 MASS OF PANCREAS: ICD-10-CM

## 2024-08-14 DIAGNOSIS — K86.89 PANCREATIC DUCT DILATED: ICD-10-CM

## 2024-08-14 PROBLEM — R76.8 HEPATITIS C ANTIBODY POSITIVE IN BLOOD: Status: RESOLVED | Noted: 2021-02-03 | Resolved: 2024-08-14

## 2024-08-14 PROCEDURE — G0439 PPPS, SUBSEQ VISIT: HCPCS | Performed by: INTERNAL MEDICINE

## 2024-08-14 PROCEDURE — G8427 DOCREV CUR MEDS BY ELIG CLIN: HCPCS | Performed by: INTERNAL MEDICINE

## 2024-08-14 PROCEDURE — 1123F ACP DISCUSS/DSCN MKR DOCD: CPT | Performed by: INTERNAL MEDICINE

## 2024-08-14 PROCEDURE — 4004F PT TOBACCO SCREEN RCVD TLK: CPT | Performed by: INTERNAL MEDICINE

## 2024-08-14 PROCEDURE — G8419 CALC BMI OUT NRM PARAM NOF/U: HCPCS | Performed by: INTERNAL MEDICINE

## 2024-08-14 PROCEDURE — 3017F COLORECTAL CA SCREEN DOC REV: CPT | Performed by: INTERNAL MEDICINE

## 2024-08-14 PROCEDURE — 99214 OFFICE O/P EST MOD 30 MIN: CPT | Performed by: INTERNAL MEDICINE

## 2024-08-14 ASSESSMENT — PATIENT HEALTH QUESTIONNAIRE - PHQ9
SUM OF ALL RESPONSES TO PHQ QUESTIONS 1-9: 9
SUM OF ALL RESPONSES TO PHQ QUESTIONS 1-9: 9
10. IF YOU CHECKED OFF ANY PROBLEMS, HOW DIFFICULT HAVE THESE PROBLEMS MADE IT FOR YOU TO DO YOUR WORK, TAKE CARE OF THINGS AT HOME, OR GET ALONG WITH OTHER PEOPLE: SOMEWHAT DIFFICULT
9. THOUGHTS THAT YOU WOULD BE BETTER OFF DEAD, OR OF HURTING YOURSELF: NOT AT ALL
8. MOVING OR SPEAKING SO SLOWLY THAT OTHER PEOPLE COULD HAVE NOTICED. OR THE OPPOSITE, BEING SO FIGETY OR RESTLESS THAT YOU HAVE BEEN MOVING AROUND A LOT MORE THAN USUAL: NOT AT ALL
7. TROUBLE CONCENTRATING ON THINGS, SUCH AS READING THE NEWSPAPER OR WATCHING TELEVISION: NOT AT ALL
9. THOUGHTS THAT YOU WOULD BE BETTER OFF DEAD, OR OF HURTING YOURSELF: NOT AT ALL
8. MOVING OR SPEAKING SO SLOWLY THAT OTHER PEOPLE COULD HAVE NOTICED. OR THE OPPOSITE, BEING SO FIGETY OR RESTLESS THAT YOU HAVE BEEN MOVING AROUND A LOT MORE THAN USUAL: NOT AT ALL
5. POOR APPETITE OR OVEREATING: NOT AT ALL
4. FEELING TIRED OR HAVING LITTLE ENERGY: NOT AT ALL
SUM OF ALL RESPONSES TO PHQ9 QUESTIONS 1 & 2: 6
SUM OF ALL RESPONSES TO PHQ QUESTIONS 1-9: 9
4. FEELING TIRED OR HAVING LITTLE ENERGY: NOT AT ALL
5. POOR APPETITE OR OVEREATING: NOT AT ALL
2. FEELING DOWN, DEPRESSED OR HOPELESS: NEARLY EVERY DAY
3. TROUBLE FALLING OR STAYING ASLEEP: NEARLY EVERY DAY
SUM OF ALL RESPONSES TO PHQ QUESTIONS 1-9: 9
SUM OF ALL RESPONSES TO PHQ9 QUESTIONS 1 & 2: 6
1. LITTLE INTEREST OR PLEASURE IN DOING THINGS: NEARLY EVERY DAY
SUM OF ALL RESPONSES TO PHQ QUESTIONS 1-9: 9
2. FEELING DOWN, DEPRESSED OR HOPELESS: NEARLY EVERY DAY
1. LITTLE INTEREST OR PLEASURE IN DOING THINGS: NEARLY EVERY DAY
6. FEELING BAD ABOUT YOURSELF - OR THAT YOU ARE A FAILURE OR HAVE LET YOURSELF OR YOUR FAMILY DOWN: NOT AT ALL
SUM OF ALL RESPONSES TO PHQ QUESTIONS 1-9: 9
3. TROUBLE FALLING OR STAYING ASLEEP: NEARLY EVERY DAY
SUM OF ALL RESPONSES TO PHQ QUESTIONS 1-9: 9
SUM OF ALL RESPONSES TO PHQ QUESTIONS 1-9: 9
6. FEELING BAD ABOUT YOURSELF - OR THAT YOU ARE A FAILURE OR HAVE LET YOURSELF OR YOUR FAMILY DOWN: NOT AT ALL
7. TROUBLE CONCENTRATING ON THINGS, SUCH AS READING THE NEWSPAPER OR WATCHING TELEVISION: NOT AT ALL

## 2024-08-14 ASSESSMENT — LIFESTYLE VARIABLES
HOW MANY STANDARD DRINKS CONTAINING ALCOHOL DO YOU HAVE ON A TYPICAL DAY: 1 OR 2
HOW OFTEN DO YOU HAVE A DRINK CONTAINING ALCOHOL: MONTHLY OR LESS

## 2024-08-14 NOTE — PATIENT INSTRUCTIONS
instructions adapted under license by the [employer]. If you have questions about a medical condition or this instruction, always ask your healthcare professional. [employer], [employer] disclaims any warranty or liability for your use of this information.      Personalized Preventive Plan for Aniceto Francois - 8/14/2024  Medicare offers a range of preventive health benefits. Some of the tests and screenings are paid in full while other may be subject to a deductible, co-insurance, and/or copay.    Some of these benefits include a comprehensive review of your medical history including lifestyle, illnesses that may run in your family, and various assessments and screenings as appropriate.    After reviewing your medical record and screening and assessments performed today your provider may have ordered immunizations, labs, imaging, and/or referrals for you.  A list of these orders (if applicable) as well as your Preventive Care list are included within your After Visit Summary for your review.    Other Preventive Recommendations:    A preventive eye exam performed by an eye specialist is recommended every 1-2 years to screen for glaucoma; cataracts, macular degeneration, and other eye disorders.  A preventive dental visit is recommended every 6 months.  Try to get at least 150 minutes of exercise per week or 10,000 steps per day on a pedometer .  Order or download the FREE \"Exercise & Physical Activity: Your Everyday Guide\" from [employer]. Call 1-729.292.9541 or search [employer] online.  You need 8597-0803 mg of calcium and 8150-3139 IU of vitamin D per day. It is possible to meet your calcium requirement with diet alone, but a vitamin D supplement is usually necessary to meet this goal.  When exposed to the sun, use a sunscreen that protects against both UVA and UVB radiation with an SPF of 30 or greater. Reapply every 2 to 3 hours or after sweating, drying off with a

## 2024-08-14 NOTE — PROGRESS NOTES
Chief Complaint   Patient presents with    Medicare AWV     \"Have you been to the ER, urgent care clinic since your last visit?  Hospitalized since your last visit?\"    NO    “Have you seen or consulted any other health care providers outside of [facility] since your last visit?”    NO        “Have you had a colorectal cancer screening such as a colonoscopy/FIT/Cologuard?    NO    No colonoscopy on file  No cologuard on file  No FIT/FOBT on file   No flexible sigmoidoscopy on file         Click Here for Release of Records Request  
Medicare Annual Wellness Visit    Aniceto Francois is here for Medicare AWV    Assessment & Plan   Medicare annual wellness visit, subsequent  Recommendations for Preventive Services Due: see orders and patient instructions/AVS.  Recommended screening schedule for the next 5-10 years is provided to the patient in written form: see Patient Instructions/AVS.     No follow-ups on file.     Subjective       Patient's complete Health Risk Assessment and screening values have been reviewed and are found in Flowsheets. The following problems were reviewed today and where indicated follow up appointments were made and/or referrals ordered.    Positive Risk Factor Screenings with Interventions:        Depression:  PHQ-2 Score: 6  PHQ-9 Total Score: 9  Total Score Interpretation: 5-9 = mild depression  Interventions:  See A/P for any pertinent orders      Controlled Medication Review:      Today's Pain Level: Pain Score: Zero       Opioid Risk: (High risk score ?55) Opioid risk score: The patient doesn't have any registry metric data available      Last PDMP Chai as Reviewed:  Review User Review Instant Review Result     @                 Dentist Screen:  Have you seen the dentist within the past year?: (!) No    Intervention:  See A/P for any pertinent orders     Vision Screen:  Do you have difficulty driving, watching TV, or doing any of your daily activities because of your eyesight?: No  Have you had an eye exam within the past year?: (!) No  Interventions:   See A/P for any pertinent orders    Safety:  Do you have any tripping hazards - loose or unsecured carpets or rugs?: (!) Yes  Interventions:  See A/P for plan and any pertinent orders     Advanced Directives:  Do you have a Living Will?: (!) No    Intervention:  has NO advanced directive - information provided        Tobacco Use:    Tobacco Use      Smoking status: Every Day        Packs/day: 0.50        Years: 0.5 packs/day for 9.6 years (4.8 ttl pk-yrs)        
Social History:   The patient is  for the past 25 years. Cabg 3 He has two daughters outside his marriage, 40 and 39, and three grandchildren.  He completed the 10th grade, but got his GED.  He is a retired .  Jain preference is [employer].  Wife with failed l tkr             Family History:  Father  53 of cancer, presumably of lung.  Mother  97, brain tumor.  No siblings.        Social Determinants of Health     Financial Resource Strain: Medium Risk (2023)    Overall Financial Resource Strain (CARDIA)     Difficulty of Paying Living Expenses: Somewhat hard   Food Insecurity:   Recent Concern: Food Insecurity - Food Insecurity Present (2023)    Hunger Vital Sign     Worried About Running Out of Food in the Last Year: Sometimes true     Ran Out of Food in the Last Year: Sometimes true    Transportation Problems ([facility] HRSN)   Physical Activity: Sufficiently Active (2024)    Exercise Vital Sign     Days of Exercise per Week: 7 days     Minutes of Exercise per Session: 30 min   Stress: Stress Concern Present (2023)    [place] [employer] - Occupational Stress Questionnaire     Feeling of Stress : To some extent   Social Connections: Moderately Integrated (2023)    Social Connection and Isolation Panel [NHANES]     Frequency of Communication with Friends and Family: Three times a week     Frequency of Social Gatherings with Friends and Family: Once a week     Attends Jain Services: 1 to 4 times per year     Active Member of Clubs or Organizations: No     Attends Club or Organization Meetings: Never     Marital Status:    Intimate Partner Violence: Not At Risk (2023)    Humiliation, Afraid, Rape, and Kick questionnaire     Fear of Current or Ex-Partner: No     Emotionally Abused: No     Physically Abused: No     Sexually Abused: No   Housing Stability: Low Risk  (2023)    Housing

## 2024-09-11 NOTE — PROGRESS NOTES
Discussed with the patient the current USPSTF guidelines released March 9, 2021 for screening for lung cancer. For adults aged 48 to 80 years who have a 20 pack-year smoking history and currently smoke or have quit within the past 15 years the grade B recommendation is to:  Screen for lung cancer with low-dose computed tomography (LDCT) every year. Stop screening once a person has not smoked for 15 years or has a health problem that limits life expectancy or the ability to have lung surgery. The patient  reports that he quit smoking about 3 years ago. His smoking use included cigarettes. He smoked an average of 1 pack per day. He has never used smokeless tobacco.. Discussed with patient the risks and benefits of screening, including over-diagnosis, false positive rate, and total radiation exposure. The patient currently exhibits no signs or symptoms suggestive of lung cancer. Discussed with patient the importance of compliance with yearly annual lung cancer screenings and willingness to undergo diagnosis and treatment if screening scan is positive. In addition, the patient was counseled regarding the importance of remaining smoke free and/or total smoking cessation.     Also reviewed the following if the patient has Medicare that as of February 10, 2022, Medicare only covers LDCT screening in patients aged 53-69 with at least a 20 pack-year smoking history who currently smoke or have quit in the last 15 years
Luwana Eisenmenger is a 77 y.o. male    Chief Complaint   Patient presents with    Follow-Up from Hospital     1. Have you been to the ER, urgent care clinic since your last visit? Hospitalized since your last visit? No    2. Have you seen or consulted any other health care providers outside of the [address] [place] [address] since your last visit? Include any pap smears or colon screening.  No
[facility]  Amina Cannon MD, [place], [address] [place] [address] [address] [address]  Phone:  [phone]  Fax: [phone]       Chief Complaint   Patient presents with    Follow-Up from [address]:    Eulogio Connelly is a 77 y.o. male  Dictation on: 10/19/2023  4:16 PM by: Chase Macedo [[id number]]          Current Outpatient Medications   Medication Sig Dispense Refill    albuterol sulfate HFA (PROVENTIL;VENTOLIN;PROAIR) 108 (90 Base) MCG/ACT inhaler Inhale 2 puffs into the lungs every 4 hours as needed      aspirin 81 MG chewable tablet Take 1 tablet by mouth daily      methadone (DOLOPHINE) 10 MG/ML solution Take 5.6 mLs by mouth daily. polyethylene glycol (GLYCOLAX) 17 GM/SCOOP powder Take 17 g by mouth 2 times daily      rosuvastatin (CRESTOR) 20 MG tablet Take 1 tablet by mouth nightly      traZODone (DESYREL) 50 MG tablet Take 1 tablet by mouth       No current facility-administered medications for this visit. Past Medical History:   Diagnosis Date    Asthma     Depression     depression    Dyslipidemia     History of cigarette smoking 1968    50 pyh    IGT (impaired glucose tolerance) 09/18/2019    Mass of pancreas 04/18/2019    eus 4-18-19  wLetty farias md gi - cytology + lymphoid vs neuroendocrine tumor 6/3/21 lucretia haile md -pancreatic neuroendocrine tumor and notes from the state indicate you are pretty presented at the tumor board for tumor board consensus opinion    Methadone maintenance therapy patient [facility])     Neuroendocrine tumor 06/29/2021    [place] -pancreatic neck body fine-needle aspiration: Consistent with well differentiated neuroendocrine tumor. No past surgical history on file.   No Known Allergies      REVIEW OF SYSTEMS:  General: negative for - chills or fever  ENT: negative for - headaches, nasal congestion or tinnitus  Respiratory: negative for - cough, hemoptysis, shortness of breath or
06-Sep-2024 22:46

## 2024-12-06 SDOH — ECONOMIC STABILITY: INCOME INSECURITY: HOW HARD IS IT FOR YOU TO PAY FOR THE VERY BASICS LIKE FOOD, HOUSING, MEDICAL CARE, AND HEATING?: VERY HARD

## 2024-12-06 SDOH — ECONOMIC STABILITY: FOOD INSECURITY: WITHIN THE PAST 12 MONTHS, THE FOOD YOU BOUGHT JUST DIDN'T LAST AND YOU DIDN'T HAVE MONEY TO GET MORE.: OFTEN TRUE

## 2024-12-06 SDOH — ECONOMIC STABILITY: FOOD INSECURITY: WITHIN THE PAST 12 MONTHS, YOU WORRIED THAT YOUR FOOD WOULD RUN OUT BEFORE YOU GOT MONEY TO BUY MORE.: OFTEN TRUE

## 2024-12-06 SDOH — ECONOMIC STABILITY: TRANSPORTATION INSECURITY
IN THE PAST 12 MONTHS, HAS LACK OF TRANSPORTATION KEPT YOU FROM MEETINGS, WORK, OR FROM GETTING THINGS NEEDED FOR DAILY LIVING?: NO

## 2024-12-09 ENCOUNTER — OFFICE VISIT (OUTPATIENT)
Facility: CLINIC | Age: 69
End: 2024-12-09
Payer: MEDICARE

## 2024-12-09 VITALS
HEIGHT: 61 IN | RESPIRATION RATE: 16 BRPM | WEIGHT: 146.6 LBS | DIASTOLIC BLOOD PRESSURE: 62 MMHG | SYSTOLIC BLOOD PRESSURE: 96 MMHG | BODY MASS INDEX: 27.68 KG/M2 | HEART RATE: 84 BPM | TEMPERATURE: 97.9 F | OXYGEN SATURATION: 97 %

## 2024-12-09 DIAGNOSIS — F17.210 CIGARETTE SMOKER: ICD-10-CM

## 2024-12-09 DIAGNOSIS — E78.5 DYSLIPIDEMIA: ICD-10-CM

## 2024-12-09 DIAGNOSIS — R73.02 IGT (IMPAIRED GLUCOSE TOLERANCE): ICD-10-CM

## 2024-12-09 DIAGNOSIS — F11.20 METHADONE MAINTENANCE THERAPY PATIENT (HCC): Primary | ICD-10-CM

## 2024-12-09 DIAGNOSIS — D3A.8 NEUROENDOCRINE TUMOR: ICD-10-CM

## 2024-12-09 DIAGNOSIS — Z12.11 SCREEN FOR COLON CANCER: ICD-10-CM

## 2024-12-09 PROCEDURE — G8419 CALC BMI OUT NRM PARAM NOF/U: HCPCS | Performed by: INTERNAL MEDICINE

## 2024-12-09 PROCEDURE — 3017F COLORECTAL CA SCREEN DOC REV: CPT | Performed by: INTERNAL MEDICINE

## 2024-12-09 PROCEDURE — 4004F PT TOBACCO SCREEN RCVD TLK: CPT | Performed by: INTERNAL MEDICINE

## 2024-12-09 PROCEDURE — G8427 DOCREV CUR MEDS BY ELIG CLIN: HCPCS | Performed by: INTERNAL MEDICINE

## 2024-12-09 PROCEDURE — G8484 FLU IMMUNIZE NO ADMIN: HCPCS | Performed by: INTERNAL MEDICINE

## 2024-12-09 PROCEDURE — 99214 OFFICE O/P EST MOD 30 MIN: CPT | Performed by: INTERNAL MEDICINE

## 2024-12-09 PROCEDURE — 1125F AMNT PAIN NOTED PAIN PRSNT: CPT | Performed by: INTERNAL MEDICINE

## 2024-12-09 PROCEDURE — 1123F ACP DISCUSS/DSCN MKR DOCD: CPT | Performed by: INTERNAL MEDICINE

## 2024-12-09 PROCEDURE — 1159F MED LIST DOCD IN RCRD: CPT | Performed by: INTERNAL MEDICINE

## 2024-12-09 SDOH — ECONOMIC STABILITY: FOOD INSECURITY: WITHIN THE PAST 12 MONTHS, YOU WORRIED THAT YOUR FOOD WOULD RUN OUT BEFORE YOU GOT MONEY TO BUY MORE.: SOMETIMES TRUE

## 2024-12-09 SDOH — ECONOMIC STABILITY: FOOD INSECURITY: WITHIN THE PAST 12 MONTHS, THE FOOD YOU BOUGHT JUST DIDN'T LAST AND YOU DIDN'T HAVE MONEY TO GET MORE.: SOMETIMES TRUE

## 2024-12-09 SDOH — ECONOMIC STABILITY: INCOME INSECURITY: HOW HARD IS IT FOR YOU TO PAY FOR THE VERY BASICS LIKE FOOD, HOUSING, MEDICAL CARE, AND HEATING?: HARD

## 2024-12-09 ASSESSMENT — PATIENT HEALTH QUESTIONNAIRE - PHQ9
2. FEELING DOWN, DEPRESSED OR HOPELESS: NOT AT ALL
6. FEELING BAD ABOUT YOURSELF - OR THAT YOU ARE A FAILURE OR HAVE LET YOURSELF OR YOUR FAMILY DOWN: NOT AT ALL
7. TROUBLE CONCENTRATING ON THINGS, SUCH AS READING THE NEWSPAPER OR WATCHING TELEVISION: NOT AT ALL
3. TROUBLE FALLING OR STAYING ASLEEP: NOT AT ALL
SUM OF ALL RESPONSES TO PHQ9 QUESTIONS 1 & 2: 0
4. FEELING TIRED OR HAVING LITTLE ENERGY: NOT AT ALL
SUM OF ALL RESPONSES TO PHQ QUESTIONS 1-9: 0
SUM OF ALL RESPONSES TO PHQ QUESTIONS 1-9: 0
1. LITTLE INTEREST OR PLEASURE IN DOING THINGS: NOT AT ALL
5. POOR APPETITE OR OVEREATING: NOT AT ALL
SUM OF ALL RESPONSES TO PHQ QUESTIONS 1-9: 0
9. THOUGHTS THAT YOU WOULD BE BETTER OFF DEAD, OR OF HURTING YOURSELF: NOT AT ALL
SUM OF ALL RESPONSES TO PHQ QUESTIONS 1-9: 0
10. IF YOU CHECKED OFF ANY PROBLEMS, HOW DIFFICULT HAVE THESE PROBLEMS MADE IT FOR YOU TO DO YOUR WORK, TAKE CARE OF THINGS AT HOME, OR GET ALONG WITH OTHER PEOPLE: NOT DIFFICULT AT ALL
8. MOVING OR SPEAKING SO SLOWLY THAT OTHER PEOPLE COULD HAVE NOTICED. OR THE OPPOSITE, BEING SO FIGETY OR RESTLESS THAT YOU HAVE BEEN MOVING AROUND A LOT MORE THAN USUAL: NOT AT ALL

## 2024-12-09 ASSESSMENT — ANXIETY QUESTIONNAIRES
1. FEELING NERVOUS, ANXIOUS, OR ON EDGE: NOT AT ALL
IF YOU CHECKED OFF ANY PROBLEMS ON THIS QUESTIONNAIRE, HOW DIFFICULT HAVE THESE PROBLEMS MADE IT FOR YOU TO DO YOUR WORK, TAKE CARE OF THINGS AT HOME, OR GET ALONG WITH OTHER PEOPLE: NOT DIFFICULT AT ALL
6. BECOMING EASILY ANNOYED OR IRRITABLE: NOT AT ALL
3. WORRYING TOO MUCH ABOUT DIFFERENT THINGS: NOT AT ALL
GAD7 TOTAL SCORE: 0
7. FEELING AFRAID AS IF SOMETHING AWFUL MIGHT HAPPEN: NOT AT ALL
2. NOT BEING ABLE TO STOP OR CONTROL WORRYING: NOT AT ALL
4. TROUBLE RELAXING: NOT AT ALL
5. BEING SO RESTLESS THAT IT IS HARD TO SIT STILL: NOT AT ALL

## 2024-12-09 NOTE — PROGRESS NOTES
Chief Complaint   Patient presents with    Follow-up    Constipation     Patient states \" he is having trouble with his bowels.\"    \"Have you been to the ER, urgent care clinic since your last visit?  Hospitalized since your last visit?\"    Yes  Urgent care constipation.    “Have you seen or consulted any other health care providers outside our system since your last visit?”    NO      “Have you had a colorectal cancer screening such as a colonoscopy/FIT/Cologuard?    NO    No colonoscopy on file  No cologuard on file  No FIT/FOBT on file   No flexible sigmoidoscopy on file           - - -

## 2024-12-09 NOTE — PROGRESS NOTES
[facility]  Gilbert Patel MD, FACP, CMD  [address] [facility] [address]  Phone:  [phone]  Fax: [phone]       Chief Complaint   Patient presents with    Follow-up    Constipation   .      SUBJECTIVE:  History of Present Illness  The        Aniceto Francois is a 69 y.o. male patient is here today for an evaluation. He has a known history of methadone maintenance therapy, dyslipidemia, neuroendocrine tumor of the pancreas, cigarette abuse, and impaired glucose tolerance.    He reports feeling slightly stressed and believes his stress levels are high. He is currently dealing with his wife's cancer diagnosis, which is causing him significant stress and depression. He is unable to work or perform daily activities due to his wife's condition, which requires him to be at home to care for her. He is retired and spends his time cooking and caring for his wife.    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 His dosage has been reduced from 240 to 110. He expresses a desire to increase his dosage, stating that he does not want to die from it, but also cannot live without it.    He is struggling to care for himself due to constipation. He has tried warm prune juice, milk of magnesia, and MiraLAX. He has noticed that his stool is dark black when he strains, but returns to a natural color when he does not. He has previously undergone a colonoscopy and other tests, all of which were positive.    He is under the care of Dr. Dunn for his pancreatic duct and has been advised to keep up with his appointments.    He has reduced his smoking to one cigarette a day but admits to smoking more when he is stressed.    He experiences a tingling sensation in his toe, which he describes as a buzzing feeling. He also reports ringing in his ears, which he finds irritating.       Current Outpatient Medications   Medication

## 2025-02-15 RX ORDER — ROSUVASTATIN CALCIUM 20 MG/1
20 TABLET, COATED ORAL NIGHTLY
Qty: 90 TABLET | Refills: 3 | Status: SHIPPED | OUTPATIENT
Start: 2025-02-15